# Patient Record
Sex: MALE | Race: WHITE | NOT HISPANIC OR LATINO | Employment: OTHER | URBAN - METROPOLITAN AREA
[De-identification: names, ages, dates, MRNs, and addresses within clinical notes are randomized per-mention and may not be internally consistent; named-entity substitution may affect disease eponyms.]

---

## 2023-07-06 LAB
ALANINE AMINOTRANSFERASE (SGPT) (U/L) IN SER/PLAS: 28 U/L (ref 10–52)
ANION GAP IN SER/PLAS: 17 MMOL/L (ref 10–20)
CALCIUM (MG/DL) IN SER/PLAS: 10.2 MG/DL (ref 8.6–10.6)
CARBON DIOXIDE, TOTAL (MMOL/L) IN SER/PLAS: 27 MMOL/L (ref 21–32)
CHLORIDE (MMOL/L) IN SER/PLAS: 100 MMOL/L (ref 98–107)
CREATININE (MG/DL) IN SER/PLAS: 1.02 MG/DL (ref 0.5–1.3)
ERYTHROCYTE DISTRIBUTION WIDTH (RATIO) BY AUTOMATED COUNT: 12.9 % (ref 11.5–14.5)
ERYTHROCYTE MEAN CORPUSCULAR HEMOGLOBIN CONCENTRATION (G/DL) BY AUTOMATED: 33.8 G/DL (ref 32–36)
ERYTHROCYTE MEAN CORPUSCULAR VOLUME (FL) BY AUTOMATED COUNT: 86 FL (ref 80–100)
ERYTHROCYTES (10*6/UL) IN BLOOD BY AUTOMATED COUNT: 5.38 X10E12/L (ref 4.5–5.9)
GFR MALE: 85 ML/MIN/1.73M2
GLUCOSE (MG/DL) IN SER/PLAS: 118 MG/DL (ref 74–99)
HEMATOCRIT (%) IN BLOOD BY AUTOMATED COUNT: 46.5 % (ref 41–52)
HEMOGLOBIN (G/DL) IN BLOOD: 15.7 G/DL (ref 13.5–17.5)
LEUKOCYTES (10*3/UL) IN BLOOD BY AUTOMATED COUNT: 4.9 X10E9/L (ref 4.4–11.3)
NRBC (PER 100 WBCS) BY AUTOMATED COUNT: 0 /100 WBC (ref 0–0)
PLATELETS (10*3/UL) IN BLOOD AUTOMATED COUNT: 196 X10E9/L (ref 150–450)
POTASSIUM (MMOL/L) IN SER/PLAS: 3.8 MMOL/L (ref 3.5–5.3)
PROSTATE SPECIFIC AG (NG/ML) IN SER/PLAS: 0.28 NG/ML (ref 0–4)
SODIUM (MMOL/L) IN SER/PLAS: 140 MMOL/L (ref 136–145)
TESTOSTERONE (NG/DL) IN SER/PLAS: 762 NG/DL (ref 240–1000)
UREA NITROGEN (MG/DL) IN SER/PLAS: 16 MG/DL (ref 6–23)

## 2023-09-18 DIAGNOSIS — E29.1 HYPOGONADISM MALE: Primary | ICD-10-CM

## 2023-09-18 RX ORDER — TESTOSTERONE 20.25 MG/1.25G
2 GEL TOPICAL DAILY
Qty: 75 G | Refills: 5 | Status: SHIPPED | OUTPATIENT
Start: 2023-09-18 | End: 2024-03-29 | Stop reason: SDUPTHER

## 2023-09-18 RX ORDER — TESTOSTERONE 20.25 MG/1.25G
2 GEL TOPICAL DAILY
COMMUNITY
End: 2023-09-18 | Stop reason: SDUPTHER

## 2023-10-16 ENCOUNTER — LAB (OUTPATIENT)
Dept: LAB | Facility: LAB | Age: 58
End: 2023-10-16
Payer: COMMERCIAL

## 2023-10-16 DIAGNOSIS — Z00.00 ROUTINE PHYSICAL EXAMINATION: ICD-10-CM

## 2023-10-16 LAB
25(OH)D3 SERPL-MCNC: 42 NG/ML (ref 30–100)
ALBUMIN SERPL BCP-MCNC: 4.7 G/DL (ref 3.4–5)
ALP SERPL-CCNC: 48 U/L (ref 33–120)
ALT SERPL W P-5'-P-CCNC: 23 U/L (ref 10–52)
ANION GAP SERPL CALC-SCNC: 12 MMOL/L (ref 10–20)
APPEARANCE UR: CLEAR
AST SERPL W P-5'-P-CCNC: 17 U/L (ref 9–39)
BASOPHILS # BLD AUTO: 0.02 X10*3/UL (ref 0–0.1)
BASOPHILS NFR BLD AUTO: 0.4 %
BILIRUB SERPL-MCNC: 0.7 MG/DL (ref 0–1.2)
BILIRUB UR STRIP.AUTO-MCNC: NEGATIVE MG/DL
BUN SERPL-MCNC: 13 MG/DL (ref 6–23)
CALCIUM SERPL-MCNC: 9.7 MG/DL (ref 8.6–10.6)
CHLORIDE SERPL-SCNC: 102 MMOL/L (ref 98–107)
CHOLEST SERPL-MCNC: 226 MG/DL (ref 0–199)
CHOLESTEROL/HDL RATIO: 4.9
CO2 SERPL-SCNC: 29 MMOL/L (ref 21–32)
COLOR UR: YELLOW
CREAT SERPL-MCNC: 0.99 MG/DL (ref 0.5–1.3)
EOSINOPHIL # BLD AUTO: 0.06 X10*3/UL (ref 0–0.7)
EOSINOPHIL NFR BLD AUTO: 1.3 %
ERYTHROCYTE [DISTWIDTH] IN BLOOD BY AUTOMATED COUNT: 12.9 % (ref 11.5–14.5)
EST. AVERAGE GLUCOSE BLD GHB EST-MCNC: 97 MG/DL
GFR SERPL CREATININE-BSD FRML MDRD: 88 ML/MIN/1.73M*2
GLUCOSE SERPL-MCNC: 101 MG/DL (ref 74–99)
GLUCOSE UR STRIP.AUTO-MCNC: NEGATIVE MG/DL
HBA1C MFR BLD: 5 %
HCT VFR BLD AUTO: 45.1 % (ref 41–52)
HDLC SERPL-MCNC: 45.8 MG/DL
HGB BLD-MCNC: 15.1 G/DL (ref 13.5–17.5)
IMM GRANULOCYTES # BLD AUTO: 0.01 X10*3/UL (ref 0–0.7)
IMM GRANULOCYTES NFR BLD AUTO: 0.2 % (ref 0–0.9)
KETONES UR STRIP.AUTO-MCNC: NEGATIVE MG/DL
LDLC SERPL CALC-MCNC: 152 MG/DL
LEUKOCYTE ESTERASE UR QL STRIP.AUTO: NEGATIVE
LYMPHOCYTES # BLD AUTO: 1.57 X10*3/UL (ref 1.2–4.8)
LYMPHOCYTES NFR BLD AUTO: 33.6 %
MCH RBC QN AUTO: 29.6 PG (ref 26–34)
MCHC RBC AUTO-ENTMCNC: 33.5 G/DL (ref 32–36)
MCV RBC AUTO: 88 FL (ref 80–100)
MONOCYTES # BLD AUTO: 0.44 X10*3/UL (ref 0.1–1)
MONOCYTES NFR BLD AUTO: 9.4 %
NEUTROPHILS # BLD AUTO: 2.57 X10*3/UL (ref 1.2–7.7)
NEUTROPHILS NFR BLD AUTO: 55.1 %
NITRITE UR QL STRIP.AUTO: NEGATIVE
NON HDL CHOLESTEROL: 180 MG/DL (ref 0–149)
NRBC BLD-RTO: 0 /100 WBCS (ref 0–0)
PH UR STRIP.AUTO: 7 [PH]
PLATELET # BLD AUTO: 194 X10*3/UL (ref 150–450)
PMV BLD AUTO: 11.2 FL (ref 7.5–11.5)
POTASSIUM SERPL-SCNC: 4.6 MMOL/L (ref 3.5–5.3)
PROT SERPL-MCNC: 7.4 G/DL (ref 6.4–8.2)
PROT UR STRIP.AUTO-MCNC: NEGATIVE MG/DL
PSA SERPL-MCNC: 0.28 NG/ML
RBC # BLD AUTO: 5.1 X10*6/UL (ref 4.5–5.9)
RBC # UR STRIP.AUTO: NEGATIVE /UL
SODIUM SERPL-SCNC: 138 MMOL/L (ref 136–145)
SP GR UR STRIP.AUTO: 1.01
TRIGL SERPL-MCNC: 142 MG/DL (ref 0–149)
TSH SERPL-ACNC: 1.71 MIU/L (ref 0.44–3.98)
UROBILINOGEN UR STRIP.AUTO-MCNC: <2 MG/DL
VLDL: 28 MG/DL (ref 0–40)
WBC # BLD AUTO: 4.7 X10*3/UL (ref 4.4–11.3)

## 2023-10-16 PROCEDURE — 85025 COMPLETE CBC W/AUTO DIFF WBC: CPT

## 2023-10-16 PROCEDURE — 36415 COLL VENOUS BLD VENIPUNCTURE: CPT

## 2023-10-16 PROCEDURE — 82306 VITAMIN D 25 HYDROXY: CPT

## 2023-10-16 PROCEDURE — 81003 URINALYSIS AUTO W/O SCOPE: CPT

## 2023-10-16 PROCEDURE — 83036 HEMOGLOBIN GLYCOSYLATED A1C: CPT

## 2023-10-16 PROCEDURE — 84443 ASSAY THYROID STIM HORMONE: CPT

## 2023-10-16 PROCEDURE — 80053 COMPREHEN METABOLIC PANEL: CPT

## 2023-10-16 PROCEDURE — 80061 LIPID PANEL: CPT

## 2023-10-16 PROCEDURE — 84153 ASSAY OF PSA TOTAL: CPT

## 2023-10-17 PROBLEM — R83.9 SPINAL FLUID ABNORM: Status: ACTIVE | Noted: 2023-10-17

## 2023-10-17 PROBLEM — G50.0 TRIGEMINAL NEURALGIA: Status: ACTIVE | Noted: 2023-10-17

## 2023-10-17 PROBLEM — M54.16 LUMBAR RADICULOPATHY: Status: ACTIVE | Noted: 2023-10-17

## 2023-10-17 PROBLEM — G89.29 CHRONIC HEADACHES: Status: ACTIVE | Noted: 2023-10-17

## 2023-10-17 PROBLEM — F41.9 ANXIETY: Status: ACTIVE | Noted: 2023-10-17

## 2023-10-17 PROBLEM — R90.89 ABNORMAL BRAIN MRI: Status: ACTIVE | Noted: 2023-10-17

## 2023-10-17 PROBLEM — M26.629 TEMPOROMANDIBULAR JOINT ARTHRALGIA: Status: ACTIVE | Noted: 2023-10-17

## 2023-10-17 PROBLEM — G54.0 THORACIC OUTLET SYNDROME: Status: ACTIVE | Noted: 2023-10-17

## 2023-10-17 PROBLEM — J34.3 NASAL TURBINATE HYPERTROPHY: Status: ACTIVE | Noted: 2023-10-17

## 2023-10-17 PROBLEM — M1A.9XX0 GOUT, CHRONIC: Status: ACTIVE | Noted: 2023-10-17

## 2023-10-17 PROBLEM — Q17.9 EAR ANOMALY: Status: ACTIVE | Noted: 2023-10-17

## 2023-10-17 PROBLEM — M54.2 CHRONIC NECK PAIN: Status: ACTIVE | Noted: 2023-10-17

## 2023-10-17 PROBLEM — M25.622 STIFFNESS OF LEFT ELBOW JOINT: Status: ACTIVE | Noted: 2023-10-17

## 2023-10-17 PROBLEM — J34.829 NASAL VALVE COLLAPSE: Status: ACTIVE | Noted: 2023-10-17

## 2023-10-17 PROBLEM — E29.1 HYPOGONADISM MALE: Status: ACTIVE | Noted: 2023-10-17

## 2023-10-17 PROBLEM — G89.29 CHRONIC NECK PAIN: Status: ACTIVE | Noted: 2023-10-17

## 2023-10-17 PROBLEM — E78.5 HYPERLIPIDEMIA: Status: ACTIVE | Noted: 2023-10-17

## 2023-10-17 PROBLEM — N52.9 MALE ERECTILE DISORDER: Status: ACTIVE | Noted: 2023-10-17

## 2023-10-17 PROBLEM — K21.9 ESOPHAGEAL REFLUX: Status: ACTIVE | Noted: 2023-10-17

## 2023-10-17 PROBLEM — R07.89 CHEST TIGHTNESS: Status: ACTIVE | Noted: 2023-10-17

## 2023-10-17 PROBLEM — T50.905A ADVERSE REACTION TO DRUG, INITIAL ENCOUNTER: Status: ACTIVE | Noted: 2023-10-17

## 2023-10-17 PROBLEM — G56.22: Status: ACTIVE | Noted: 2023-10-17

## 2023-10-17 PROBLEM — E23.6 EMPTY SELLA SYNDROME (MULTI): Status: ACTIVE | Noted: 2023-10-17

## 2023-10-17 PROBLEM — G50.9 TRIGEMINAL NEUROPATHY: Status: ACTIVE | Noted: 2023-10-17

## 2023-10-17 PROBLEM — R91.8 LUNG NODULES: Status: ACTIVE | Noted: 2023-10-17

## 2023-10-17 PROBLEM — R07.89 CHEST PRESSURE: Status: ACTIVE | Noted: 2023-10-17

## 2023-10-17 PROBLEM — R51.9 CHRONIC HEADACHES: Status: ACTIVE | Noted: 2023-10-17

## 2023-10-17 PROBLEM — R22.2 LUMP OF SKIN OF BACK: Status: ACTIVE | Noted: 2023-10-17

## 2023-10-17 PROBLEM — M54.12 CERVICAL RADICULOPATHY: Status: ACTIVE | Noted: 2023-10-17

## 2023-10-17 PROBLEM — R19.00 ABDOMINAL WALL BULGE: Status: ACTIVE | Noted: 2023-10-17

## 2023-10-17 PROBLEM — R20.0 FACIAL NUMBNESS: Status: ACTIVE | Noted: 2023-10-17

## 2023-10-17 PROBLEM — D12.6 TUBULAR ADENOMA OF COLON: Status: ACTIVE | Noted: 2023-10-17

## 2023-10-17 PROBLEM — D75.1 SECONDARY POLYCYTHEMIA: Status: ACTIVE | Noted: 2023-10-17

## 2023-10-17 PROBLEM — E78.00 PURE HYPERCHOLESTEROLEMIA: Status: ACTIVE | Noted: 2023-10-17

## 2023-10-17 PROBLEM — M25.852 FEMOROACETABULAR IMPINGEMENT OF LEFT HIP: Status: ACTIVE | Noted: 2023-10-17

## 2023-10-17 PROBLEM — M13.0 POLYARTHRITIS: Status: ACTIVE | Noted: 2023-10-17

## 2023-10-17 PROBLEM — R31.9 HEMATURIA: Status: ACTIVE | Noted: 2023-10-17

## 2023-10-17 PROBLEM — M54.81 OCCIPITAL NEURALGIA OF LEFT SIDE: Status: ACTIVE | Noted: 2023-10-17

## 2023-10-17 PROBLEM — K76.9 LIVER LESION: Status: ACTIVE | Noted: 2023-10-17

## 2023-10-17 PROBLEM — J31.0 CHRONIC RHINITIS: Status: ACTIVE | Noted: 2023-10-17

## 2023-10-17 PROBLEM — D40.0 NEOPLASM OF UNCERTAIN BEHAVIOR OF PROSTATE: Status: ACTIVE | Noted: 2023-10-17

## 2023-10-17 PROBLEM — R89.9 ABNORMAL LABORATORY TEST: Status: ACTIVE | Noted: 2023-10-17

## 2023-10-17 PROBLEM — M99.01 SEGMENTAL AND SOMATIC DYSFUNCTION OF CERVICAL REGION: Status: ACTIVE | Noted: 2023-10-17

## 2023-10-17 PROBLEM — E79.0 HYPERURICEMIA: Status: ACTIVE | Noted: 2023-10-17

## 2023-10-17 PROBLEM — G89.29 CHRONIC PAIN: Status: ACTIVE | Noted: 2023-10-17

## 2023-10-17 PROBLEM — R39.9 LOWER URINARY TRACT SYMPTOMS (LUTS): Status: ACTIVE | Noted: 2023-10-17

## 2023-10-17 PROBLEM — S46.212A BICEPS RUPTURE, DISTAL, LEFT, INITIAL ENCOUNTER: Status: ACTIVE | Noted: 2023-10-17

## 2023-10-17 PROBLEM — M95.0 NASAL VALVE COLLAPSE: Status: ACTIVE | Noted: 2023-10-17

## 2023-10-17 PROBLEM — N48.6 PEYRONIE DISEASE: Status: ACTIVE | Noted: 2023-10-17

## 2023-10-17 PROBLEM — E55.9 VITAMIN D DEFICIENCY: Status: ACTIVE | Noted: 2023-10-17

## 2023-10-17 PROBLEM — J34.2 NASAL SEPTAL DEVIATION: Status: ACTIVE | Noted: 2023-10-17

## 2023-10-17 PROBLEM — T78.1XXA ADVERSE FOOD REACTION: Status: ACTIVE | Noted: 2023-10-17

## 2023-10-17 RX ORDER — FINASTERIDE 5 MG
0.25 TABLET ORAL DAILY
COMMUNITY
Start: 2023-06-02

## 2023-10-17 RX ORDER — PREDNISONE 10 MG/1
10 TABLET ORAL 2 TIMES DAILY
COMMUNITY
Start: 2012-10-29 | End: 2023-10-18 | Stop reason: ALTCHOICE

## 2023-10-17 RX ORDER — TRAMADOL HYDROCHLORIDE 50 MG/1
50-100 TABLET ORAL EVERY 6 HOURS PRN
COMMUNITY
Start: 2012-10-29 | End: 2023-10-18 | Stop reason: ALTCHOICE

## 2023-10-17 RX ORDER — LIDOCAINE HYDROCHLORIDE 20 MG/ML
15 SOLUTION ORAL; TOPICAL
COMMUNITY
Start: 2023-01-19 | End: 2023-10-18 | Stop reason: ALTCHOICE

## 2023-10-17 RX ORDER — FEBUXOSTAT 40 MG/1
40 TABLET, FILM COATED ORAL DAILY
COMMUNITY
End: 2024-05-24 | Stop reason: SDUPTHER

## 2023-10-17 RX ORDER — PREDNISONE 5 MG/1
5 TABLET ORAL
COMMUNITY
Start: 2012-10-29 | End: 2023-10-18 | Stop reason: ALTCHOICE

## 2023-10-17 RX ORDER — ERGOCALCIFEROL (VITAMIN D2) 50 MCG
4000 CAPSULE ORAL DAILY
COMMUNITY
Start: 2018-10-19

## 2023-10-17 RX ORDER — COLCHICINE 0.6 MG/1
0.6 TABLET ORAL DAILY
COMMUNITY
Start: 2012-11-14

## 2023-10-17 RX ORDER — IBUPROFEN 200 MG
600 TABLET ORAL 2 TIMES DAILY
COMMUNITY
End: 2023-10-18 | Stop reason: ALTCHOICE

## 2023-10-17 RX ORDER — DULOXETIN HYDROCHLORIDE 30 MG/1
60 CAPSULE, DELAYED RELEASE ORAL DAILY
COMMUNITY
Start: 2022-12-01 | End: 2023-10-18 | Stop reason: ALTCHOICE

## 2023-10-17 RX ORDER — TADALAFIL 5 MG/1
1 TABLET ORAL DAILY
COMMUNITY
Start: 2023-01-23 | End: 2024-03-29 | Stop reason: SDUPTHER

## 2023-10-17 RX ORDER — TURMERIC ROOT EXTRACT 500 MG
1 TABLET ORAL DAILY
COMMUNITY

## 2023-10-17 RX ORDER — CARBAMAZEPINE 200 MG/1
1 TABLET ORAL 2 TIMES DAILY
COMMUNITY
Start: 2019-02-13 | End: 2023-10-18 | Stop reason: ALTCHOICE

## 2023-10-17 RX ORDER — ALLOPURINOL 300 MG/1
TABLET ORAL
COMMUNITY
Start: 2012-11-07 | End: 2023-10-18 | Stop reason: ALTCHOICE

## 2023-10-17 RX ORDER — CHOLECALCIFEROL (VITAMIN D3) 50 MCG
100 TABLET ORAL DAILY
COMMUNITY

## 2023-10-17 RX ORDER — INDOMETHACIN 25 MG/1
25 CAPSULE ORAL
COMMUNITY
Start: 2022-11-09 | End: 2023-10-18 | Stop reason: ALTCHOICE

## 2023-10-18 ENCOUNTER — OFFICE VISIT (OUTPATIENT)
Dept: PRIMARY CARE | Facility: CLINIC | Age: 58
End: 2023-10-18
Payer: COMMERCIAL

## 2023-10-18 VITALS
HEART RATE: 70 BPM | SYSTOLIC BLOOD PRESSURE: 124 MMHG | DIASTOLIC BLOOD PRESSURE: 80 MMHG | BODY MASS INDEX: 31.1 KG/M2 | WEIGHT: 210 LBS | OXYGEN SATURATION: 97 % | HEIGHT: 69 IN

## 2023-10-18 DIAGNOSIS — M54.16 LUMBAR RADICULOPATHY: ICD-10-CM

## 2023-10-18 DIAGNOSIS — D12.6 TUBULAR ADENOMA OF COLON: Primary | ICD-10-CM

## 2023-10-18 DIAGNOSIS — G50.9 TRIGEMINAL NEUROPATHY: ICD-10-CM

## 2023-10-18 DIAGNOSIS — Z12.11 COLON CANCER SCREENING: ICD-10-CM

## 2023-10-18 DIAGNOSIS — R26.81 GAIT INSTABILITY: ICD-10-CM

## 2023-10-18 DIAGNOSIS — E29.1 HYPOGONADISM MALE: ICD-10-CM

## 2023-10-18 DIAGNOSIS — E78.2 MIXED HYPERLIPIDEMIA: Primary | ICD-10-CM

## 2023-10-18 DIAGNOSIS — K21.9 GASTROESOPHAGEAL REFLUX DISEASE WITHOUT ESOPHAGITIS: ICD-10-CM

## 2023-10-18 DIAGNOSIS — M1A.9XX0 CHRONIC GOUT WITHOUT TOPHUS, UNSPECIFIED CAUSE, UNSPECIFIED SITE: ICD-10-CM

## 2023-10-18 PROCEDURE — UHSPHYS PR UH SELECT PHYSICAL: Performed by: INTERNAL MEDICINE

## 2023-10-18 RX ORDER — BEMPEDOIC ACID 180 MG/1
180 TABLET, FILM COATED ORAL DAILY
Qty: 30 TABLET | Refills: 11 | Status: SHIPPED | OUTPATIENT
Start: 2023-10-18 | End: 2023-10-27 | Stop reason: SINTOL

## 2023-10-18 RX ORDER — POLYETHYLENE GLYCOL 3350, SODIUM CHLORIDE, SODIUM BICARBONATE AND POTASSIUM CHLORIDE 420; 5.72; 11.2; 1.48 G/4L; G/4L; G/4L; G/4L
4000 POWDER, FOR SOLUTION ORAL ONCE
Qty: 4000 ML | Refills: 0 | Status: SHIPPED | OUTPATIENT
Start: 2023-10-18 | End: 2023-10-18

## 2023-10-18 ASSESSMENT — ENCOUNTER SYMPTOMS
FREQUENCY: 0
BRUISES/BLEEDS EASILY: 0
WHEEZING: 0
CHEST TIGHTNESS: 0
HEADACHES: 0
ABDOMINAL PAIN: 0
NAUSEA: 0
FEVER: 0
MYALGIAS: 0
SORE THROAT: 0
POLYDIPSIA: 0
SHORTNESS OF BREATH: 0
CHILLS: 0
SINUS PAIN: 0
ABDOMINAL DISTENTION: 0
RHINORRHEA: 0
ARTHRALGIAS: 0
DYSPHORIC MOOD: 0
BLOOD IN STOOL: 0
ADENOPATHY: 0
ACTIVITY CHANGE: 0
HEMATURIA: 0
SLEEP DISTURBANCE: 0
DIZZINESS: 0
BACK PAIN: 0
NECK STIFFNESS: 0
SINUS PRESSURE: 0
FATIGUE: 0
CONSTIPATION: 0
JOINT SWELLING: 0
PALPITATIONS: 0
HYPERACTIVE: 0
DIARRHEA: 0
WEAKNESS: 0
DYSURIA: 0
CONFUSION: 0
TROUBLE SWALLOWING: 0
CONSTITUTIONAL NEGATIVE: 1
FACIAL SWELLING: 0
LIGHT-HEADEDNESS: 0
NERVOUS/ANXIOUS: 0
VOMITING: 0
UNEXPECTED WEIGHT CHANGE: 0
NUMBNESS: 0
COUGH: 0

## 2023-10-18 ASSESSMENT — PAIN SCALES - GENERAL: PAINLEVEL: 2

## 2023-10-18 NOTE — ASSESSMENT & PLAN NOTE
Lipids remain above goal.  We will start him on bempedoic acid 180 mg daily.  He will return in 6 weeks for repeat lipids.  Patient is intolerant of statins due to allergy.

## 2023-10-18 NOTE — ASSESSMENT & PLAN NOTE
GERD symptoms are under much better control he is taking no medication currently for it takes as needed antacids.

## 2023-10-18 NOTE — PROGRESS NOTES
Link Reyes       Patient here for complete physical and a general checkup.  He generally feels quite well.  He has several ongoing issues.  1.  There is made of his hyperlipidemia patient has had chronic hyperlipidemia but is unable to take statins due to some type of allergy.  He has never been on Nexletol.  2.  He continues to struggle with issues around the left side of his body he had a history of a fracture of his left ankle number of years ago and since that time his had weakness in his left hip flexors and waist area.  He has done physical therapy but still has ongoing issues with gait and an occasional weakness.  He is also had some chronic headaches on the left side as well.  He has done some good work with physical therapy and has significantly improved his headachesSo he is no longer taking any medication for that.  3.  He has a history of chronic constipation.  He does not take any fiber supplements and does not drink very much in the way of water.  4.  His weight remains about 20 pounds over his ideal body weight.  He exercises vigorously on a regular basis but does not watch his diet very carefully.  Otherwise the patient denies chest pain shortness of breath GI or  complaints.  He remains on Uloric for his gout and has not had an attack in a number of years.  He continues to take Proscar for hair loss which has been fairly effective for him.           Review of Systems   Constitutional: Negative.  Negative for activity change, chills, fatigue, fever and unexpected weight change.   HENT:  Negative for dental problem, ear pain, facial swelling, hearing loss, mouth sores, rhinorrhea, sinus pressure, sinus pain, sore throat, tinnitus and trouble swallowing.    Eyes:  Negative for visual disturbance.   Respiratory:  Negative for cough, chest tightness, shortness of breath and wheezing.    Cardiovascular:  Negative for chest pain, palpitations and leg swelling.   Gastrointestinal:  Negative  "for abdominal distention, abdominal pain, blood in stool, constipation, diarrhea, nausea and vomiting.   Endocrine: Negative for cold intolerance, heat intolerance, polydipsia and polyuria.   Genitourinary:  Negative for dysuria, frequency, hematuria and urgency.   Musculoskeletal:  Negative for arthralgias, back pain, joint swelling, myalgias and neck stiffness.   Skin:  Negative for rash.   Allergic/Immunologic: Negative for food allergies.   Neurological:  Negative for dizziness, weakness, light-headedness, numbness and headaches.   Hematological:  Negative for adenopathy. Does not bruise/bleed easily.   Psychiatric/Behavioral:  Negative for confusion, dysphoric mood and sleep disturbance. The patient is not nervous/anxious and is not hyperactive.           Objective      Visit Vitals  /80 (BP Location: Right arm, Patient Position: Sitting, BP Cuff Size: Adult)   Pulse 70   Ht 1.74 m (5' 8.5\")   Wt 95.3 kg (210 lb)   SpO2 97%   BMI 31.47 kg/m²   Smoking Status Never   BSA 2.15 m²        Physical Exam  Constitutional:       Appearance: Normal appearance. He is normal weight.   HENT:      Head: Normocephalic.      Right Ear: Tympanic membrane and ear canal normal.      Left Ear: Tympanic membrane and ear canal normal.      Nose: Nose normal.      Mouth/Throat:      Pharynx: Oropharynx is clear.   Eyes:      Extraocular Movements: Extraocular movements intact.      Conjunctiva/sclera: Conjunctivae normal.      Pupils: Pupils are equal, round, and reactive to light.   Neck:      Thyroid: No thyroid mass or thyromegaly.      Vascular: No carotid bruit or JVD.   Cardiovascular:      Rate and Rhythm: Normal rate and regular rhythm.      Pulses: Normal pulses.           Carotid pulses are 2+ on the right side and 2+ on the left side.       Radial pulses are 2+ on the right side and 2+ on the left side.        Femoral pulses are 2+ on the right side and 2+ on the left side.       Popliteal pulses are 2+ on the right " side and 2+ on the left side.        Dorsalis pedis pulses are 2+ on the right side and 2+ on the left side.        Posterior tibial pulses are 2+ on the right side and 2+ on the left side.      Heart sounds: Normal heart sounds. No murmur heard.  Pulmonary:      Effort: Pulmonary effort is normal.      Breath sounds: Normal breath sounds.   Abdominal:      General: Abdomen is flat. Bowel sounds are normal. There is no distension.      Palpations: Abdomen is soft. There is no mass.      Tenderness: There is no guarding or rebound.   Genitourinary:     Penis: Normal.       Prostate: Normal.      Rectum: Normal.   Musculoskeletal:         General: Normal range of motion.      Cervical back: Normal range of motion and neck supple.      Right lower leg: No edema.      Left lower leg: No edema.   Lymphadenopathy:      Cervical: No cervical adenopathy.   Skin:     General: Skin is warm and dry.      Findings: No lesion or rash.   Neurological:      General: No focal deficit present.      Mental Status: He is alert and oriented to person, place, and time.   Psychiatric:         Mood and Affect: Mood normal.        Problem List Items Addressed This Visit       Esophageal reflux     GERD symptoms are under much better control he is taking no medication currently for it takes as needed antacids.         Hyperlipidemia - Primary     Lipids remain above goal.  We will start him on bempedoic acid 180 mg daily.  He will return in 6 weeks for repeat lipids.  Patient is intolerant of statins due to allergy.         Relevant Medications    bempedoic acid (Nexletol) 180 mg tablet    Gout, chronic     Remains on Uloric daily he has had very few flareups in the last several years.         Hypogonadism male     Patient remains on testosterone supplements.  He is doing quite well.         Lumbar radiculopathy     Patient has this chronic syndrome of weakness in his hip flexors and pelvic girdle.  He has been to physical therapy but his  symptoms are persisting I think makes sense to have him evaluated by physical medicine and rehab deceiving get into a more comprehensive program of strengthening and gait modification.         Trigeminal neuropathy     Symptoms have pretty much resolved with some Pilates and stress reduction modalities.  He is off all medications for it.         Colon cancer screening     Patient is due for colonoscopy we will get that scheduled for him.         Relevant Orders    Colonoscopy Screening     Other Visit Diagnoses       Gait instability        Relevant Orders    Referral to Physical Medicine Rehab

## 2023-10-18 NOTE — ASSESSMENT & PLAN NOTE
Patient has this chronic syndrome of weakness in his hip flexors and pelvic girdle.  He has been to physical therapy but his symptoms are persisting I think makes sense to have him evaluated by physical medicine and rehab deceiving get into a more comprehensive program of strengthening and gait modification.

## 2023-10-18 NOTE — ASSESSMENT & PLAN NOTE
Symptoms have pretty much resolved with some Pilates and stress reduction modalities.  He is off all medications for it.

## 2023-10-27 DIAGNOSIS — E78.2 MIXED HYPERLIPIDEMIA: Primary | ICD-10-CM

## 2023-10-27 RX ORDER — ICOSAPENT ETHYL 1 G/1
2 CAPSULE ORAL
Qty: 120 CAPSULE | Refills: 11 | Status: SHIPPED | OUTPATIENT
Start: 2023-10-27 | End: 2024-10-26

## 2023-11-02 ENCOUNTER — APPOINTMENT (OUTPATIENT)
Dept: ORTHOPEDIC SURGERY | Facility: CLINIC | Age: 58
End: 2023-11-02
Payer: COMMERCIAL

## 2023-11-03 ENCOUNTER — APPOINTMENT (OUTPATIENT)
Dept: GASTROENTEROLOGY | Facility: HOSPITAL | Age: 58
End: 2023-11-03
Payer: COMMERCIAL

## 2023-11-30 ENCOUNTER — OFFICE VISIT (OUTPATIENT)
Dept: ORTHOPEDIC SURGERY | Facility: CLINIC | Age: 58
End: 2023-11-30
Payer: COMMERCIAL

## 2023-11-30 DIAGNOSIS — M53.3 SACROILIAC JOINT DYSFUNCTION OF LEFT SIDE: Primary | ICD-10-CM

## 2023-11-30 DIAGNOSIS — R19.00 ABDOMINAL WALL BULGE: ICD-10-CM

## 2023-11-30 DIAGNOSIS — M21.42 PES PLANUS OF LEFT FOOT: ICD-10-CM

## 2023-11-30 DIAGNOSIS — M25.552 LEFT HIP PAIN: ICD-10-CM

## 2023-11-30 PROCEDURE — 99204 OFFICE O/P NEW MOD 45 MIN: CPT | Performed by: PHYSICAL MEDICINE & REHABILITATION

## 2023-11-30 NOTE — PROGRESS NOTES
PM&R  / Ortho clinic eval:    IMPRESSION:    Complex chronic left lower extremity tightness and malalignment.    The only objective physical finding is reduced left hip range of motion and positive left anterior innominate rotation/sacroiliac dysfunction, and mild left calf atrophy probably related to his remote tib fib fracture.  Pes planus is slightly worse on the left also  Left-sided neck head and back pain, probably old trigeminal neuralgia with superimposed facet joint pain, much improved from previous and I do not think directly related to his lower extremity issues    RECOMMENDATIONS:  -Get MRI left hip, I will look at images and message with results  -Consult Dr. Leda Ly for her opinion, and probably manipulation for left SI joint and possible adaptations to his home therapy program.  He might benefit from some needling also?  If he does not make progress with this I would recommend a repeat lumbar MRI and I would see him again afterwards to interpret.  -We had a pretty long discussion about foot orthoses, and he is doing about as well as  I'd expect with over-the-counter power step devices, next step would be a custom molded semirigid pair, possibly with more medial posting on the left, but this is a fairly large commitment and might not be tolerated - so we will defer for now  -Discussed his brief vertigo symptoms from today, possible migraine but reconfirmed plan from Dr. Molina for emergency evaluation if symptoms recur or anything else atypical neurological occurs -Sometimes these symptoms are triggered by cervical facet joint pain so we will keep that in mind also  f/u next available after hip MRI     Diagnoses and plan discussed with the patient, patient educated on above diagnoses and treatments, including alternatives     Anson Graves MD, FAAPMR, R-MSK  Chief, Division of PM&R  Board Certified in PM&R and Sports  "Medicine  ____________________________________________________________________    11/30/2023    CC: Patient complains of left sided body issues - weakness    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    Seen at the request of Roberto Molina via  [employer], for left sided weakness/pain/tightness. H/o left sided head and trigeminal and occipital nerve pain ~2015.  Also Tib/fib Fx in 2005 playing kickball with kids.  Chronic neck head and face pain has been much improved but still constant low-grade.   More concern is left sided hip and back pain.  He has already been doing extensive work with a  and with [employer], including one-on-one sessions.  It feels of he rotates his leg externally everything falls back into alignment, but without it things are \"pushed backwards\" and also tends to \"fall forward\".  He also gets a sensation that the left lower abdomen is more prominent.  Pain scale  minimal - more like weakness in lower body but 2/10 on average and 5/10 worst pain in past week.    Treadmill and bike. And weights - but MUCH weaker on left side with isolated strength exercises.      Patient reports no fevers, chills, sweats, night pain, weight loss or cancer history, no bowel/bladder control issues.     I reviewed the Orthopedic Surgery Adult Patient History Sheet from 11/30/2023 including pertinent history and ROS.     Pertinent Physical Exam:  MSK: Cervical Spine: ROM mildly reduced all planes without provocation of pain, mild thoracic kyphosis but overall normal alignment, mild mid cervical facet tender, Spurling negative   Lumbar Spine: Mildly reduced  ROM more limited but without significant pain in extension, none tender to palpation, mild left anterior innominate pelvic tilt/rotation,  SI joint maneuvers mildly positive with Kd, Gaenslen and spring test and does have restricted hip range of motion with tightness and pain mostly around the lateral hip but some into the sacroiliac " region.  Str Leg Raise negative  Neuro: Normal Sensation, strength, bulk and tone of upper and  lower limbs bilaterally except mild left calf atrophy about 1 cm circumference difference, reflexes normal at patella but reduced/trace at Achilles bilaterally.    SUPPORTING DOCUMENTATION (remaining history, exam, other findings):  Work-up reviewed - this has included I personally interpreted his MRI angiogram of the head and neck from 2023 for spine structures, but these are fairly poorly visualized.  CT of cervical spine from 2019 did show some spondylosis/facet arthropathy worst at C5-6 but no significant stenosis.  MRI brachial plexus from 2022 was normal, and I did personally interpret his lumbar MRI from 2021, which shows mild degenerative changes at L4-5 and L5-S1, there is a small disc bulge at L4-5 with subarticular stenosis but this is more prominent to the right side and does not fit with current symptoms.    Treatment has included one-on-one work with a  and Pilates therapist, fairly extensively over the past year.  He has done chiropractic manipulation of some kind in the years past with success.  Does not take pain medications currently    See above for Assessment and Plan

## 2023-11-30 NOTE — LETTER
December 2, 2023     Roberto aGrcía MD  [address] [place] [address] [place] [address]    Patient: Link Reyes   YOB: 1965   Date of Visit: 11/30/2023       Dear Dr. Roberto García MD:    Thank you for referring Link Reyes to me for evaluation. Below are my notes for this consultation.  If you have questions, please do not hesitate to call me. I look forward to following your patient along with you.       Sincerely,     Anson Graves MD      CC: No Recipients  ______________________________________________________________________________________    PM&R  / Ortho clinic eval:    IMPRESSION:    Complex chronic left lower extremity tightness and malalignment.    The only objective physical finding is reduced left hip range of motion and positive left anterior innominate rotation/sacroiliac dysfunction, and mild left calf atrophy probably related to his remote tib fib fracture.  Pes planus is slightly worse on the left also  Left-sided neck head and back pain, probably old trigeminal neuralgia with superimposed facet joint pain, much improved from previous and I do not think directly related to his lower extremity issues    RECOMMENDATIONS:  -Get MRI left hip, I will look at images and message with results  -Consult Dr. Leda Ly for her opinion, and probably manipulation for left SI joint and possible adaptations to his home therapy program.  He might benefit from some needling also?  If he does not make progress with this I would recommend a repeat lumbar MRI and I would see him again afterwards to interpret.  -We had a pretty long discussion about foot orthoses, and he is doing about as well as  I'd expect with over-the-counter power step devices, next step would be a custom molded semirigid pair, possibly with more medial posting on the left, but this is a fairly large commitment and might not be tolerated - so we will defer for now  -Discussed his brief vertigo symptoms from today,  "possible migraine but reconfirmed plan from Dr. Molina for emergency evaluation if symptoms recur or anything else atypical neurological occurs -Sometimes these symptoms are triggered by cervical facet joint pain so we will keep that in mind also  f/u next available after hip MRI     Diagnoses and plan discussed with the patient, patient educated on above diagnoses and treatments, including alternatives     Anson Graves MD, FAAPMR, R-MSK  Chief, Division of PM&R  Board Certified in PM&R and Sports Medicine  ____________________________________________________________________    11/30/2023    CC: Patient complains of left sided body issues - weakness    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    Seen at the request of Roberto Molina via [employer], for left sided weakness/pain/tightness. H/o left sided head and trigeminal and occipital nerve pain ~2015.  Also Tib/fib Fx in 2005 playing kickball with kids.  Chronic neck head and face pain has been much improved but still constant low-grade.   More concern is left sided hip and back pain.  He has already been doing extensive work with a  and with [employer], including one-on-one sessions.  It feels of he rotates his leg externally everything falls back into alignment, but without it things are \"pushed backwards\" and also tends to \"fall forward\".  He also gets a sensation that the left lower abdomen is more prominent.  Pain scale  minimal - more like weakness in lower body but 2/10 on average and 5/10 worst pain in past week.    Treadmill and bike. And weights - but MUCH weaker on left side with isolated strength exercises.      Patient reports no fevers, chills, sweats, night pain, weight loss or cancer history, no bowel/bladder control issues.     I reviewed the Orthopedic Surgery Adult Patient History Sheet from 11/30/2023 including pertinent history and ROS.     Pertinent Physical Exam:  MSK: Cervical Spine: ROM mildly reduced all " planes without provocation of pain, mild thoracic kyphosis but overall normal alignment, mild mid cervical facet tender, Spurling negative   Lumbar Spine: Mildly reduced  ROM more limited but without significant pain in extension, none tender to palpation, mild left anterior innominate pelvic tilt/rotation,  SI joint maneuvers mildly positive with Kd, Gaenslen and spring test and does have restricted hip range of motion with tightness and pain mostly around the lateral hip but some into the sacroiliac region.  Str Leg Raise negative  Neuro: Normal Sensation, strength, bulk and tone of upper and  lower limbs bilaterally except mild left calf atrophy about 1 cm circumference difference, reflexes normal at patella but reduced/trace at Achilles bilaterally.    SUPPORTING DOCUMENTATION (remaining history, exam, other findings):  Work-up reviewed - this has included I personally interpreted his MRI angiogram of the head and neck from 2023 for spine structures, but these are fairly poorly visualized.  CT of cervical spine from 2019 did show some spondylosis/facet arthropathy worst at C5-6 but no significant stenosis.  MRI brachial plexus from 2022 was normal, and I did personally interpret his lumbar MRI from 2021, which shows mild degenerative changes at L4-5 and L5-S1, there is a small disc bulge at L4-5 with subarticular stenosis but this is more prominent to the right side and does not fit with current symptoms.    Treatment has included one-on-one work with a  and Pilates therapist, fairly extensively over the past year.  He has done chiropractic manipulation of some kind in the years past with success.  Does not take pain medications currently    See above for Assessment and Plan

## 2023-12-02 PROBLEM — M53.3 SACROILIAC JOINT DYSFUNCTION OF LEFT SIDE: Status: ACTIVE | Noted: 2023-12-02

## 2023-12-02 PROBLEM — M21.42 PES PLANUS OF LEFT FOOT: Status: ACTIVE | Noted: 2023-12-02

## 2023-12-02 PROBLEM — M25.552 LEFT HIP PAIN: Status: ACTIVE | Noted: 2023-12-02

## 2023-12-08 ENCOUNTER — TELEPHONE (OUTPATIENT)
Dept: PRIMARY CARE | Facility: CLINIC | Age: 58
End: 2023-12-08
Payer: COMMERCIAL

## 2023-12-08 DIAGNOSIS — J01.90 ACUTE SINUSITIS, RECURRENCE NOT SPECIFIED, UNSPECIFIED LOCATION: Primary | ICD-10-CM

## 2023-12-08 RX ORDER — DOXYCYCLINE 100 MG/1
100 TABLET ORAL 2 TIMES DAILY
Qty: 14 TABLET | Refills: 0 | Status: SHIPPED | OUTPATIENT
Start: 2023-12-08 | End: 2023-12-15

## 2023-12-08 NOTE — TELEPHONE ENCOUNTER
Called back and discussed with patient  Seems like his issues are really more than a week ago -he did feel uncomfortable shortly after the exposure but then things seem to kind of quiet down  He does report significant sensitivities to allergens in the air  He is not somewhat short of breath with this but does feel he is lost his stamina a bit  No marked wheezing  No prior history of allergy is doing some throat clearing on occasion  Has not really been taking anything for this  No fever or chills  Infrequent cough occasionally productive of some greenish phlegm  Does have significant sinus pressure worse little bit when he leans forward  Just feels very fatigued  By his description certainly sounds like a second sickening after an exposure -not aware that we have to be concerned about a mold exposure per se  Will try the following  Expect will follow-up in 48-72 hours or sooner if worsening/concerns  Requested Prescriptions     Signed Prescriptions Disp Refills    doxycycline (Adoxa) 100 mg tablet 14 tablet 0     Sig: Take 1 tablet (100 mg) by mouth 2 times a day for 7 days. Take with a full glass of water     Authorizing Provider: VERONICA CHANG

## 2023-12-08 NOTE — TELEPHONE ENCOUNTER
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 Sun and Mon. He felt very sick. He went back to work Tues because he started to feel better. Now he states he is coughing a lot. He never had a fever. Please call to discuss.

## 2023-12-18 ASSESSMENT — ENCOUNTER SYMPTOMS
FEVER: 0
CONFUSION: 0
NAUSEA: 0
DYSURIA: 0
FATIGUE: 0
SHORTNESS OF BREATH: 0
VOMITING: 0
WEAKNESS: 0
APPETITE CHANGE: 0
DIZZINESS: 0
WOUND: 0

## 2023-12-18 NOTE — PROGRESS NOTES
"Subjective   Patient ID: Link Reyes is a 58 y.o. male who presents today for the examination and treatment of his chronic left sided body pain including the left jaw, left neck, left shoulder, left hip and left sacrum.    This is visit 1 of the calendar year. [employer].     Fall risk: None. No falls in the last 6 months.     HPI -he did see Dr. Anson De Leon on 11/30/2023 for this same complaint of chronic left lower extremity tightness and hip pain.  There is also some mild left calf atrophy but this is more likely related to a history of tib-fib fracture.  His flatfoot is slightly more pronounced on the left as well.  An MRI has been ordered of the left hip- this has not yet been scheduled/done. Dr. Graves believes that manipulation of the left SI joint may help with his recovery as well as potentially some dry needling to help with myofascial tightness and imbalance.      He explains that his complaints today started approximately 10 years ago.  He did have an issue with a feeling that through his left jaw off and was starting to cause TMJ and trigeminal pain.  This was corrected however \"the damage has been done\".  Since then he has dealt with a lot of tightness in the left jaw, left suboccipital, scalenes, left pec and shoulder.  This is compounded by his left-sided ankle pain which started in the 90s after a left Achilles repair and exacerbated in the early 2000's with his left ankle fracture with hardware.  He recognizes now that the left arch does fall and he tends to pronate the foot, internally rotate the left knee and feels as if he is always twisted to the right.    He does work with a  one-on-one twice a week and is trying desperately to strengthen the left side of his body.  He feels as if it is weak because he is unable to properly engage the muscles.  He has been to the chiropractor once but did not have a true manipulation instead there was some sort of mechanical " device that was used.  He did not find any benefit with this.    Given recent antibiotic, 12/8/2023, due to some sinusitis issues.  No other significant health issues.    Social history: He works in large commercial real estate development.  His wife is on the board for  [employer] and children.  He is the father of 3 young adults.    Patient describes the pain as achiness, constant, sharp, stiffness, tightness, dull, and nagging, and rates the current pain 7 out of 10 (10 being the worst).     Review of Systems   Constitutional:  Negative for appetite change, fatigue and fever.   HENT:  Negative for congestion and hearing loss.    Eyes:  Negative for visual disturbance.   Respiratory:  Negative for shortness of breath.    Cardiovascular:  Negative for chest pain.   Gastrointestinal:  Negative for nausea and vomiting.        On Uloric for his gout and has not had an attack in a number of years.    Genitourinary:  Negative for dysuria.   Musculoskeletal:          Tib/fib Fx in 2005 playing kickball with kids.   IMAGING (Dr. Graves): CT of cervical spine from 2019 did show some spondylosis/facet arthropathy worst at C5-6 but no significant stenosis.  MRI brachial plexus from 2022 was normal, and I did personally interpret his lumbar MRI from 2021, which shows mild degenerative changes at L4-5 and L5-S1, there is a small disc bulge at L4-5 with subarticular stenosis but this is more prominent to the right side and does not fit with current symptoms.   Skin:  Negative for rash and wound.   Neurological:  Negative for dizziness and weakness.   Psychiatric/Behavioral:  Negative for confusion.    All other systems reviewed and are negative.      Objective   Observation : normal gait and normal posture. L ankle pronation.     Physical Exam  Constitutional:       General: He is not in acute distress.  HENT:      Nose: No congestion or rhinorrhea.   Musculoskeletal:      Cervical back: Tenderness present. No  deformity, signs of trauma or bony tenderness. Pain with movement present. Decreased range of motion.      Thoracic back: Tenderness present. No signs of trauma or bony tenderness. Decreased range of motion.      Lumbar back: Spasms and tenderness present. No bony tenderness. Decreased range of motion. Negative right straight leg raise test and negative left straight leg raise test.      Right hip: Normal.      Left hip: Tenderness present. No bony tenderness or crepitus. Decreased range of motion.   Neurological:      Mental Status: He is oriented to person, place, and time.      Sensory: Sensation is intact.      Motor: Motor function is intact.      Coordination: Coordination is intact.     Examination findings (palpation & ROM): Tenderness, hypertonicity and trigger points palpated throughout the left SCM, left scalenes, left upper trapezius, left pec, left glute Medius, left iliolumbar ligament.  Left heel to buttock was reduced compared to the right with left-sided low back pain and quad stiffness.  No knee pain.    Other Orthopedic Tests:  Cervical: Right Maximum compression normal and with tightness.  Left Maximum compression with local pain and with tightness.  Right Shoulder depression with local pain and with tightness.  Left Shoulder depression with tightness.  Thoracic/Lumbar/Sacrum: Right Calvo's Lumbar normal.  Left Calvo's Lumbar normal.  Right Nachlas' normal.  Left Nachlas' with local pain and with tightness.    Segmental joint dysfunction was identified in the following areas using motion palpation and/or pain provocation assessment:  Cervical: C0-C4 (Supine)  Thoracic: T4-T7 (Prone)  Lumbopelvic: L4L5/S1, L SIJ (Side-posture and Drop)    Today's treatment:  Performed spinal manipulation to the regions of segmental dysfunction identified on examination using age-appropriate and injury specific force, and manual diversified technique.     Performed soft tissue manipulation including IASTM (instrument  assisted soft tissue mobilization), MFR (Myofacial Release) and IC (ischemic compression) to the hypertonic paraspinal muscles including left SCM, left scalenes, left upper trapezius, left pec, left glute Medius, left iliolumbar ligament to patient tolerance. Performed dry needling to the region of the chief complaint to the hypertonic muscles identified upon palpation, including L SIJ, L glute medius (10 in, 10 out).    (Start time 10:15 , End Time 10:40). 2 units.     Treatment Plan:   The patient and I discussed the risks and benefits of chiropractic care. Based on the patient's subjective complaints along with the examination findings, it is advised that a course of chiropractic treatment by initiated. Consent for care was given both written and orally by the patient. The patient tolerated today's treatment with little or no additional discomfort and was instructed to contact the office for questions or concerns.     Treatment Frequency: Will see/treat patient every 2-4 weeks as therapeutic benefit is sustained, then frequency will be reduced to as needed for symptom management or as needed for acute flare-ups/exacerbation.     Please note: Voice-to-text software was used when completing this note.  While the note was proofread, portions may include grammatical errors.  Please contact me with any questions/concerns as it relates to these types of errors.

## 2023-12-19 ENCOUNTER — ALLIED HEALTH (OUTPATIENT)
Dept: INTEGRATIVE MEDICINE | Facility: CLINIC | Age: 58
End: 2023-12-19
Payer: COMMERCIAL

## 2023-12-19 DIAGNOSIS — M79.10 MYALGIA: ICD-10-CM

## 2023-12-19 DIAGNOSIS — M99.02 SEGMENTAL AND SOMATIC DYSFUNCTION OF THORACIC REGION: ICD-10-CM

## 2023-12-19 DIAGNOSIS — M99.01 SEGMENTAL AND SOMATIC DYSFUNCTION OF CERVICAL REGION: ICD-10-CM

## 2023-12-19 DIAGNOSIS — G89.29 CHRONIC JAW PAIN: ICD-10-CM

## 2023-12-19 DIAGNOSIS — M99.05 SEGMENTAL AND SOMATIC DYSFUNCTION OF PELVIC REGION: ICD-10-CM

## 2023-12-19 DIAGNOSIS — M25.512 CHRONIC LEFT SHOULDER PAIN: ICD-10-CM

## 2023-12-19 DIAGNOSIS — M24.552 HIP FLEXOR TENDON TIGHTNESS, LEFT: ICD-10-CM

## 2023-12-19 DIAGNOSIS — G44.86 CERVICOGENIC HEADACHE: ICD-10-CM

## 2023-12-19 DIAGNOSIS — M99.03 SEGMENTAL AND SOMATIC DYSFUNCTION OF LUMBAR REGION: ICD-10-CM

## 2023-12-19 DIAGNOSIS — M54.2 NECK PAIN: ICD-10-CM

## 2023-12-19 DIAGNOSIS — M79.9 POSTURAL STRAIN: ICD-10-CM

## 2023-12-19 DIAGNOSIS — G89.29 CHRONIC LEFT SHOULDER PAIN: ICD-10-CM

## 2023-12-19 DIAGNOSIS — M53.3 SACRAL BACK PAIN: Primary | ICD-10-CM

## 2023-12-19 DIAGNOSIS — M99.04 SEGMENTAL AND SOMATIC DYSFUNCTION OF SACRAL REGION: ICD-10-CM

## 2023-12-19 DIAGNOSIS — R68.84 CHRONIC JAW PAIN: ICD-10-CM

## 2023-12-19 DIAGNOSIS — M99.00 SEGMENTAL AND SOMATIC DYSFUNCTION OF HEAD REGION: ICD-10-CM

## 2023-12-19 PROCEDURE — 98942 CHIROPRACTIC MANJ 5 REGIONS: CPT | Performed by: CHIROPRACTOR

## 2023-12-19 PROCEDURE — 97140 MANUAL THERAPY 1/> REGIONS: CPT | Performed by: CHIROPRACTOR

## 2023-12-19 PROCEDURE — 99203 OFFICE O/P NEW LOW 30 MIN: CPT | Performed by: CHIROPRACTOR

## 2024-01-09 DIAGNOSIS — Z12.11 SPECIAL SCREENING FOR MALIGNANT NEOPLASMS, COLON: ICD-10-CM

## 2024-01-09 RX ORDER — SODIUM PICOSULFATE, MAGNESIUM OXIDE, AND ANHYDROUS CITRIC ACID 12; 3.5; 1 G/175ML; G/175ML; MG/175ML
1 LIQUID ORAL SEE ADMIN INSTRUCTIONS
Qty: 175 ML | Refills: 0 | Status: SHIPPED | OUTPATIENT
Start: 2024-01-09

## 2024-01-10 ENCOUNTER — ALLIED HEALTH (OUTPATIENT)
Dept: INTEGRATIVE MEDICINE | Facility: CLINIC | Age: 59
End: 2024-01-10
Payer: COMMERCIAL

## 2024-01-10 DIAGNOSIS — M99.05 SEGMENTAL AND SOMATIC DYSFUNCTION OF PELVIC REGION: ICD-10-CM

## 2024-01-10 DIAGNOSIS — M54.2 NECK PAIN: ICD-10-CM

## 2024-01-10 DIAGNOSIS — M99.00 SEGMENTAL AND SOMATIC DYSFUNCTION OF HEAD REGION: ICD-10-CM

## 2024-01-10 DIAGNOSIS — M99.03 SEGMENTAL AND SOMATIC DYSFUNCTION OF LUMBAR REGION: ICD-10-CM

## 2024-01-10 DIAGNOSIS — M99.01 SEGMENTAL AND SOMATIC DYSFUNCTION OF CERVICAL REGION: Primary | ICD-10-CM

## 2024-01-10 DIAGNOSIS — M99.04 SEGMENTAL AND SOMATIC DYSFUNCTION OF SACRAL REGION: ICD-10-CM

## 2024-01-10 DIAGNOSIS — M25.512 CHRONIC LEFT SHOULDER PAIN: ICD-10-CM

## 2024-01-10 DIAGNOSIS — G89.29 CHRONIC LEFT SHOULDER PAIN: ICD-10-CM

## 2024-01-10 DIAGNOSIS — M79.10 MYALGIA: ICD-10-CM

## 2024-01-10 DIAGNOSIS — M79.9 POSTURAL STRAIN: ICD-10-CM

## 2024-01-10 DIAGNOSIS — M99.02 SEGMENTAL AND SOMATIC DYSFUNCTION OF THORACIC REGION: ICD-10-CM

## 2024-01-10 DIAGNOSIS — G44.86 CERVICOGENIC HEADACHE: ICD-10-CM

## 2024-01-10 DIAGNOSIS — M53.3 SACRAL BACK PAIN: ICD-10-CM

## 2024-01-10 DIAGNOSIS — M24.552 HIP FLEXOR TENDON TIGHTNESS, LEFT: ICD-10-CM

## 2024-01-10 PROCEDURE — 98942 CHIROPRACTIC MANJ 5 REGIONS: CPT | Performed by: CHIROPRACTOR

## 2024-01-10 PROCEDURE — 97140 MANUAL THERAPY 1/> REGIONS: CPT | Performed by: CHIROPRACTOR

## 2024-01-10 ASSESSMENT — ENCOUNTER SYMPTOMS
DIZZINESS: 0
WEAKNESS: 0
CONFUSION: 0
SHORTNESS OF BREATH: 0
DYSURIA: 0
FEVER: 0
VOMITING: 0
APPETITE CHANGE: 0
WOUND: 0
FATIGUE: 0
NAUSEA: 0

## 2024-01-10 NOTE — PROGRESS NOTES
"Subjective   Patient ID: Link Reyes is a 58 y.o. male who presents today for a follow-up treatment of his chronic left sided body pain including the left jaw, left neck, left shoulder, left hip and left sacrum.    This is visit 1 of the 2024 calendar year. [employer].     Fall risk: None. No falls in the last 6 months.     HPI -Visit #2.  He reports that he is extremely happy with the results of his initial chiropractic treatment.  He has maintained increased range of motion in his neck and feels less stiffness and tightness on the left side of the neck and jaw.  However, he feels as if he needs some work on the right side now as the side feels a little tighter than the left with endrange movements.  Additionally, he would like to focus on the left mid back/intrascapular region which always feels \"stuck\".  He does report an incident when he was running and he slipped and fell landing on his back.  He is curious if this may have been the trigger for his pain.  He has had his massage therapist work on it at length without significant relief.  Also, he reports that he felt as if he could walk better after our last treatment but today the left hip and sacrum feel tight again.  Will try to recreate the treatment that was performed to the left hip and sacrum previously.    Patient describes the pain as achiness, constant, sharp, stiffness, tightness, dull, and nagging, and rates the current pain 6 out of 10 (10 being the worst).     INITIAL INTAKE/SUBJECTIVE 12/19/23: he did see Dr. Anson De Leon on 11/30/2023 for this same complaint of chronic left lower extremity tightness and hip pain.  There is also some mild left calf atrophy but this is more likely related to a history of tib-fib fracture.  His flatfoot is slightly more pronounced on the left as well.  An MRI has been ordered of the left hip- this has not yet been scheduled/done. Dr. Graves believes that manipulation of the left SI joint may help with " "his recovery as well as potentially some dry needling to help with myofascial tightness and imbalance.      He explains that his complaints today started approximately 10 years ago.  He did have an issue with a feeling that through his left jaw off and was starting to cause TMJ and trigeminal pain.  This was corrected however \"the damage has been done\".  Since then he has dealt with a lot of tightness in the left jaw, left suboccipital, scalenes, left pec and shoulder.  This is compounded by his left-sided ankle pain which started in the 90s after a left Achilles repair and exacerbated in the early 2000's with his left ankle fracture with hardware.  He recognizes now that the left arch does fall and he tends to pronate the foot, internally rotate the left knee and feels as if he is always twisted to the right.    He does work with a  one-on-one twice a week and is trying desperately to strengthen the left side of his body.  He feels as if it is weak because he is unable to properly engage the muscles.  He has been to the chiropractor once but did not have a true manipulation instead there was some sort of mechanical device that was used.  He did not find any benefit with this.    Given recent antibiotic, 12/8/2023, due to some sinusitis issues.  No other significant health issues.    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  His wife is on the board for  [employer] and children.  He is the father of 3 young adults.    Review of Systems   Constitutional:  Negative for appetite change, fatigue and fever.   HENT:  Negative for congestion and hearing loss.    Eyes:  Negative for visual disturbance.   Respiratory:  Negative for shortness of breath.    Cardiovascular:  Negative for chest pain.   Gastrointestinal:  Negative for nausea and vomiting.        On Uloric for his gout and has not had an attack in a number of years.    Genitourinary:  Negative for dysuria. "   Musculoskeletal:          Tib/fib Fx in 2005 playing kickball with kids.   IMAGING (Dr. Graves): CT of cervical spine from 2019 did show some spondylosis/facet arthropathy worst at C5-6 but no significant stenosis.  MRI brachial plexus from 2022 was normal, and I did personally interpret his lumbar MRI from 2021, which shows mild degenerative changes at L4-5 and L5-S1, there is a small disc bulge at L4-5 with subarticular stenosis but this is more prominent to the right side and does not fit with current symptoms.   Skin:  Negative for rash and wound.   Neurological:  Negative for dizziness and weakness.   Psychiatric/Behavioral:  Negative for confusion.    All other systems reviewed and are negative.      Objective   Observation : normal gait and normal posture. L ankle pronation.     Physical ExamExamination findings (palpation & ROM): Tenderness, hypertonicity and trigger points palpated throughout the R upper trapezius, R levator scapula, left rhomboids, L middle/lower trap, L intercostals, L thoracic paraspinals, left iliolumbar ligament and L hip flexor.  Left heel to buttock was reduced compared to the right with left-sided low back pain and quad stiffness.  No knee pain.    Segmental joint dysfunction was identified in the following areas using motion palpation and/or pain provocation assessment:  Cervical: C0-C4 (Supine)  Thoracic: T4-T7 (Prone)  Lumbopelvic: L4L5/S1, L SIJ (Side-posture and Drop)    Today's treatment:  Performed spinal manipulation to the regions of segmental dysfunction identified on examination using age-appropriate and injury specific force, and manual diversified technique.     Performed seated, supine and prone soft tissue manipulation including IASTM (instrument assisted soft tissue mobilization), MFR (Myofacial Release) and IC (ischemic compression) to the hypertonic muscles including R upper trapezius, R levator scapula, left rhomboids, L middle/lower trap, L intercostals, L  thoracic paraspinals, left iliolumbar ligament to patient tolerance. (Start time 11:40 am , End Time 11:55 am). 1 units.     Additionally, it was advised that he consider purchasing a chirp wheel for thoracic extension/mobilization.  He is open to this.  We also talked about utilizing a couch stretch to open up the left hip flexor to alleviate some of the strain on the left sacrum.    Treatment Plan:   The patient and I discussed the risks and benefits of chiropractic care. Based on the patient's subjective complaints along with the examination findings, it is advised that a course of chiropractic treatment by initiated. Consent for care was given both written and orally by the patient. The patient tolerated today's treatment with little or no additional discomfort and was instructed to contact the office for questions or concerns.     Treatment Frequency: Will see/treat patient every 2-4 weeks as therapeutic benefit is sustained, then frequency will be reduced to as needed for symptom management or as needed for acute flare-ups/exacerbation.     Please note: Voice-to-text software was used when completing this note.  While the note was proofread, portions may include grammatical errors.  Please contact me with any questions/concerns as it relates to these types of errors.

## 2024-01-12 ENCOUNTER — TELEPHONE (OUTPATIENT)
Dept: ORTHOPEDIC SURGERY | Facility: CLINIC | Age: 59
End: 2024-01-12

## 2024-01-12 ENCOUNTER — OFFICE VISIT (OUTPATIENT)
Dept: PRIMARY CARE | Facility: CLINIC | Age: 59
End: 2024-01-12
Payer: COMMERCIAL

## 2024-01-12 VITALS — SYSTOLIC BLOOD PRESSURE: 114 MMHG | OXYGEN SATURATION: 96 % | DIASTOLIC BLOOD PRESSURE: 72 MMHG

## 2024-01-12 DIAGNOSIS — M53.3 SACROILIAC JOINT DYSFUNCTION OF LEFT SIDE: ICD-10-CM

## 2024-01-12 DIAGNOSIS — M25.552 LEFT HIP PAIN: Primary | ICD-10-CM

## 2024-01-12 DIAGNOSIS — R05.2 SUBACUTE COUGH: Primary | ICD-10-CM

## 2024-01-12 PROCEDURE — 99213 OFFICE O/P EST LOW 20 MIN: CPT | Performed by: INTERNAL MEDICINE

## 2024-01-12 RX ORDER — PREDNISONE 10 MG/1
TABLET ORAL
Qty: 12 TABLET | Refills: 0 | Status: SHIPPED | OUTPATIENT
Start: 2024-01-12 | End: 2024-01-17

## 2024-01-12 RX ORDER — BENZONATATE 200 MG/1
200 CAPSULE ORAL 3 TIMES DAILY PRN
Qty: 42 CAPSULE | Refills: 0 | Status: SHIPPED | OUTPATIENT
Start: 2024-01-12 | End: 2024-02-11

## 2024-01-12 NOTE — PATIENT INSTRUCTIONS
It was nice to meet you  I do think the cough is a postinfectious cough from inflammation.  However with your recent history of COVID we are going to obtain an x-ray.  We are going to start prednisone 30 mg for 2 days 20 mg for 2 days 10 mg for 2 days then stop to treat the inflammation.  I have also sent a prescription for Tessalon Perles to the pharmacy which is a cough suppressant to use as needed

## 2024-01-12 NOTE — PROGRESS NOTES
Subjective   Patient ID: Link Reyes is a 58 y.o. male.    HPI  Patient presents today with complaints of cough now ongoing for 3-4 weeks   Had Covid 3 weeks ago  with body aches etc since that time he has had dry  hacking  cough that time he is able to sleep it is worse in the daytime if he talks for any time.  He does not have fevers or chills the cough is making him exhausted just because he keeps coughing        Past medical history significant for hypercholesteremia has not yet started the Vascepa  Gout  Chronic constipation  Hair loss  Low testosterone level uses testosterone gel    Review of Systems    Objective   Physical Exam  Well-developed no acute distress he does seem to have occasional dry cough especially when taking a deep breath or talking  Exam there is mild cerumen in the left ear otherwise normal  Lungs are clear though does cough when trying to take a deep breath  Cardiovascular regular rate and rhythm      Assessment/Plan   #1Cough after having COVID about 3 weeks ago just cannot shake the cough it is dry and hacking exhausting at times just from the cough but no fevers no chills I do think this is postinfectious cough or from the inflammation however we will obtain a chest x-ray I would like him to start prednisone starting with 30 mg decrease by 10 mg every 2 days until complete and Tessalon Perles 200 mg 3 times daily as needed as well he will let us know if further problems or issues  20 minutes were spent with patient of which greater than 50% was spent in counseling and coordination of care  This note was partially generated using the Dragon voice recognition system.  There may be some incorrect wording ,grammar, spelling or punctuation errors that were not corrected prior to committing the note to the medical record.

## 2024-01-12 NOTE — TELEPHONE ENCOUNTER
MRI hip denied due to lack of Xray and conservative care.    Rena - please have him get xray I just ordered (any [facility] location) and make fu visit with me to review his progress with chiropractic.    OR he may just want to pay for MRI out of pocket for MRI, but still would need f/u visit with me to review.

## 2024-01-17 NOTE — TELEPHONE ENCOUNTER
Patient called and message left to inform patient of MRI denial and that a Xray of hip has been ordered that needs to be completed at any  facility. Requested a call back from patient to set up a follow up appointment with Dr. Graves

## 2024-01-18 ENCOUNTER — APPOINTMENT (OUTPATIENT)
Dept: ORTHOPEDIC SURGERY | Facility: CLINIC | Age: 59
End: 2024-01-18
Payer: COMMERCIAL

## 2024-01-18 ENCOUNTER — APPOINTMENT (OUTPATIENT)
Dept: GASTROENTEROLOGY | Facility: EXTERNAL LOCATION | Age: 59
End: 2024-01-18
Payer: COMMERCIAL

## 2024-01-22 ENCOUNTER — APPOINTMENT (OUTPATIENT)
Dept: RADIOLOGY | Facility: HOSPITAL | Age: 59
End: 2024-01-22
Payer: COMMERCIAL

## 2024-01-23 ENCOUNTER — ALLIED HEALTH (OUTPATIENT)
Dept: INTEGRATIVE MEDICINE | Facility: CLINIC | Age: 59
End: 2024-01-23
Payer: COMMERCIAL

## 2024-01-23 ENCOUNTER — HOSPITAL ENCOUNTER (OUTPATIENT)
Dept: RADIOLOGY | Facility: CLINIC | Age: 59
Discharge: HOME | End: 2024-01-23
Payer: COMMERCIAL

## 2024-01-23 DIAGNOSIS — G89.29 CHRONIC LEFT SHOULDER PAIN: ICD-10-CM

## 2024-01-23 DIAGNOSIS — M53.3 SACRAL BACK PAIN: ICD-10-CM

## 2024-01-23 DIAGNOSIS — G44.86 CERVICOGENIC HEADACHE: ICD-10-CM

## 2024-01-23 DIAGNOSIS — M99.02 SEGMENTAL AND SOMATIC DYSFUNCTION OF THORACIC REGION: ICD-10-CM

## 2024-01-23 DIAGNOSIS — M53.3 SACROILIAC JOINT DYSFUNCTION OF LEFT SIDE: ICD-10-CM

## 2024-01-23 DIAGNOSIS — M24.552 HIP FLEXOR TENDON TIGHTNESS, LEFT: ICD-10-CM

## 2024-01-23 DIAGNOSIS — M99.05 SEGMENTAL AND SOMATIC DYSFUNCTION OF PELVIC REGION: ICD-10-CM

## 2024-01-23 DIAGNOSIS — M25.552 LEFT HIP PAIN: ICD-10-CM

## 2024-01-23 DIAGNOSIS — M25.512 CHRONIC LEFT SHOULDER PAIN: ICD-10-CM

## 2024-01-23 DIAGNOSIS — M99.03 SEGMENTAL AND SOMATIC DYSFUNCTION OF LUMBAR REGION: ICD-10-CM

## 2024-01-23 DIAGNOSIS — M99.00 SEGMENTAL AND SOMATIC DYSFUNCTION OF HEAD REGION: ICD-10-CM

## 2024-01-23 DIAGNOSIS — M99.01 SEGMENTAL AND SOMATIC DYSFUNCTION OF CERVICAL REGION: Primary | ICD-10-CM

## 2024-01-23 DIAGNOSIS — M79.10 MYALGIA: ICD-10-CM

## 2024-01-23 DIAGNOSIS — M99.04 SEGMENTAL AND SOMATIC DYSFUNCTION OF SACRAL REGION: ICD-10-CM

## 2024-01-23 DIAGNOSIS — M79.9 POSTURAL STRAIN: ICD-10-CM

## 2024-01-23 DIAGNOSIS — M54.2 NECK PAIN: ICD-10-CM

## 2024-01-23 PROCEDURE — 73502 X-RAY EXAM HIP UNI 2-3 VIEWS: CPT | Mod: LEFT SIDE | Performed by: RADIOLOGY

## 2024-01-23 PROCEDURE — 97140 MANUAL THERAPY 1/> REGIONS: CPT | Performed by: CHIROPRACTOR

## 2024-01-23 PROCEDURE — 98942 CHIROPRACTIC MANJ 5 REGIONS: CPT | Performed by: CHIROPRACTOR

## 2024-01-23 PROCEDURE — 73502 X-RAY EXAM HIP UNI 2-3 VIEWS: CPT | Mod: LT

## 2024-01-23 ASSESSMENT — ENCOUNTER SYMPTOMS
DYSURIA: 0
VOMITING: 0
SHORTNESS OF BREATH: 0
WOUND: 0
APPETITE CHANGE: 0
FATIGUE: 0
CONFUSION: 0
WEAKNESS: 0
DIZZINESS: 0
FEVER: 0
NAUSEA: 0

## 2024-01-23 NOTE — PROGRESS NOTES
"Subjective   Patient ID: Link Reyes is a 58 y.o. male who presents today for a follow-up treatment of his chronic left sided body pain including the left jaw, left neck, left shoulder, left hip and left sacrum.    This is visit 2 of the 2024 calendar year. [employer].     Fall risk: None. No falls in the last 6 months.     HPI -overall he is still adrenally pleased with his treatment and has had significantly less headaches since our last treatment encounter on 1/10/2024.  However he did purchase the Chirp Wheel and reports that it felt amazing while performing the exercises but feels as if he may have \"overdone it\".  He reports that he did 2 different exercises lasting approximately 5 minutes x 2 and the next morning he had an intense left-sided headache again.  He had not had this in some time and was slightly discouraged.  Upon awakening the next morning the headache did resolve.  Again while doing some of his home exercises including a supported squat with a exercise ball on the wall he felt the same headache again.  He is curious as if there is some issues with tightness and scar tissue along the spine causing these issues.  It is my impression that he may have some adhesions attaching to the dura.  Many of his headaches seem as if they are dural tension like in nature/quality.    We will continue to work with soft tissue release (dry needling when appropriate) instrument assisted soft tissue mobilization and manipulation to free up some of these adhesions and restore better biomechanics.  It is advised that he really try the Chirp Wheel but only use it for about 1 minute/day.  He may have overdone it.  It was explained that we do not want to move things too fast.    Patient describes the pain as achiness, constant, sharp, stiffness, tightness, dull, and nagging, and rates the current pain 6 out of 10 (10 being the worst).     Last treatment 1/10/24: Visit #2.  He reports that he is extremely happy with " "the results of his initial chiropractic treatment.  He has maintained increased range of motion in his neck and feels less stiffness and tightness on the left side of the neck and jaw.  However, he feels as if he needs some work on the right side now as the side feels a little tighter than the left with endrange movements.  Additionally, he would like to focus on the left mid back/intrascapular region which always feels \"stuck\".  He does report an incident when he was running and he slipped and fell landing on his back.  He is curious if this may have been the trigger for his pain.  He has had his massage therapist work on it at length without significant relief.  Also, he reports that he felt as if he could walk better after our last treatment but today the left hip and sacrum feel tight again.  Will try to recreate the treatment that was performed to the left hip and sacrum previously.    INITIAL INTAKE/SUBJECTIVE 12/19/23: he did see Dr. Anson De Leon on 11/30/2023 for this same complaint of chronic left lower extremity tightness and hip pain.  There is also some mild left calf atrophy but this is more likely related to a history of tib-fib fracture.  His flatfoot is slightly more pronounced on the left as well.  An MRI has been ordered of the left hip- this has not yet been scheduled/done. Dr. Graves believes that manipulation of the left SI joint may help with his recovery as well as potentially some dry needling to help with myofascial tightness and imbalance.      He explains that his complaints today started approximately 10 years ago.  He did have an issue with a feeling that through his left jaw off and was starting to cause TMJ and trigeminal pain.  This was corrected however \"the damage has been done\".  Since then he has dealt with a lot of tightness in the left jaw, left suboccipital, scalenes, left pec and shoulder.  This is compounded by his left-sided ankle pain which started in the 90s after a " left Achilles repair and exacerbated in the early 2000's with his left ankle fracture with hardware.  He recognizes now that the left arch does fall and he tends to pronate the foot, internally rotate the left knee and feels as if he is always twisted to the right.    He does work with a  one-on-one twice a week and is trying desperately to strengthen the left side of his body.  He feels as if it is weak because he is unable to properly engage the muscles.  He has been to the chiropractor once but did not have a true manipulation instead there was some sort of mechanical device that was used.  He did not find any benefit with this.    Given recent antibiotic, 12/8/2023, due to some sinusitis issues.  No other significant health issues.    Social history: He works in large commercial real estate development.  His wife is on the board for  [employer] and children.  He is the father of 3 young adults.    Review of Systems   Constitutional:  Negative for appetite change, fatigue and fever.   HENT:  Negative for congestion and hearing loss.    Eyes:  Negative for visual disturbance.   Respiratory:  Negative for shortness of breath.    Cardiovascular:  Negative for chest pain.   Gastrointestinal:  Negative for nausea and vomiting.        On Uloric for his gout and has not had an attack in a number of years.    Genitourinary:  Negative for dysuria.   Musculoskeletal:          Tib/fib Fx in 2005 playing kickball with kids.   IMAGING (Dr. Graves): CT of cervical spine from 2019 did show some spondylosis/facet arthropathy worst at C5-6 but no significant stenosis.  MRI brachial plexus from 2022 was normal, and I did personally interpret his lumbar MRI from 2021, which shows mild degenerative changes at L4-5 and L5-S1, there is a small disc bulge at L4-5 with subarticular stenosis but this is more prominent to the right side and does not fit with current symptoms.   Skin:  Negative for rash and wound.    Neurological:  Negative for dizziness and weakness.   Psychiatric/Behavioral:  Negative for confusion.    All other systems reviewed and are negative.    Objective   Observation : normal gait and normal posture. L ankle pronation.     Physical ExamExamination findings (palpation & ROM): Tenderness, hypertonicity and trigger points palpated throughout the R upper trapezius, R levator scapula, left rhomboids, L middle/lower trap, L intercostals, L thoracic paraspinals, left iliolumbar ligament and L hip flexor.  Left heel to buttock was reduced compared to the right with left-sided low back pain and quad stiffness.  No knee pain.    Segmental joint dysfunction was identified in the following areas using motion palpation and/or pain provocation assessment:  Cervical: C0-C4 (Supine)  Thoracic: T4-T7 (Prone)  Lumbopelvic: L4L5/S1, L SIJ (Side-posture and Drop)    Today's treatment:  Performed spinal manipulation to the regions of segmental dysfunction identified on examination using age-appropriate and injury specific force, and manual diversified technique.     Performed seated, supine and prone soft tissue manipulation including IASTM (instrument assisted soft tissue mobilization), MFR (Myofacial Release) and IC (ischemic compression) to the hypertonic muscles including L upper trapezius, L levator scapula, left rhomboids, L middle/lower trap, L intercostals, L thoracic paraspinals, left iliolumbar ligament to patient tolerance. (Start time 1:00 pm , End Time 1:15 pm). 1 units.     Continue chirp wheel for thoracic extension/mobilization (1 minute only).  Continue couch stretch to open up the left hip flexor to alleviate some of the strain on the left sacrum.    Treatment Plan:   The patient and I discussed the risks and benefits of chiropractic care. Based on the patient's subjective complaints along with the examination findings, it is advised that a course of chiropractic treatment by initiated. Consent for care  was given both written and orally by the patient. The patient tolerated today's treatment with little or no additional discomfort and was instructed to contact the office for questions or concerns.     Treatment Frequency: Will see/treat patient every 2-4 weeks as therapeutic benefit is sustained, then frequency will be reduced to as needed for symptom management or as needed for acute flare-ups/exacerbation.     Please note: Voice-to-text software was used when completing this note.  While the note was proofread, portions may include grammatical errors.  Please contact me with any questions/concerns as it relates to these types of errors.

## 2024-02-02 ENCOUNTER — TELEPHONE (OUTPATIENT)
Dept: ORTHOPEDIC SURGERY | Facility: CLINIC | Age: 59
End: 2024-02-02
Payer: COMMERCIAL

## 2024-02-02 NOTE — TELEPHONE ENCOUNTER
Patient called and message left for patient to return call to make a FUV with Dr. Graves to discuss radiology results and plan of care

## 2024-02-12 ENCOUNTER — ALLIED HEALTH (OUTPATIENT)
Dept: INTEGRATIVE MEDICINE | Facility: CLINIC | Age: 59
End: 2024-02-12
Payer: COMMERCIAL

## 2024-02-12 DIAGNOSIS — M99.05 SEGMENTAL AND SOMATIC DYSFUNCTION OF PELVIC REGION: ICD-10-CM

## 2024-02-12 DIAGNOSIS — M99.04 SEGMENTAL AND SOMATIC DYSFUNCTION OF SACRAL REGION: ICD-10-CM

## 2024-02-12 DIAGNOSIS — M99.03 SEGMENTAL AND SOMATIC DYSFUNCTION OF LUMBAR REGION: ICD-10-CM

## 2024-02-12 DIAGNOSIS — M99.02 SEGMENTAL AND SOMATIC DYSFUNCTION OF THORACIC REGION: ICD-10-CM

## 2024-02-12 DIAGNOSIS — G44.86 CERVICOGENIC HEADACHE: ICD-10-CM

## 2024-02-12 DIAGNOSIS — M24.552 HIP FLEXOR TENDON TIGHTNESS, LEFT: ICD-10-CM

## 2024-02-12 DIAGNOSIS — M99.00 SEGMENTAL AND SOMATIC DYSFUNCTION OF HEAD REGION: ICD-10-CM

## 2024-02-12 DIAGNOSIS — M79.10 MYALGIA: ICD-10-CM

## 2024-02-12 DIAGNOSIS — M79.9 POSTURAL STRAIN: ICD-10-CM

## 2024-02-12 DIAGNOSIS — M99.01 SEGMENTAL AND SOMATIC DYSFUNCTION OF CERVICAL REGION: Primary | ICD-10-CM

## 2024-02-12 DIAGNOSIS — M25.512 CHRONIC LEFT SHOULDER PAIN: ICD-10-CM

## 2024-02-12 DIAGNOSIS — M54.2 NECK PAIN: ICD-10-CM

## 2024-02-12 DIAGNOSIS — G89.29 CHRONIC LEFT SHOULDER PAIN: ICD-10-CM

## 2024-02-12 DIAGNOSIS — M53.3 SACRAL BACK PAIN: ICD-10-CM

## 2024-02-12 DIAGNOSIS — M25.552 PAIN OF LEFT HIP: ICD-10-CM

## 2024-02-12 PROCEDURE — 97140 MANUAL THERAPY 1/> REGIONS: CPT | Performed by: CHIROPRACTOR

## 2024-02-12 PROCEDURE — 98942 CHIROPRACTIC MANJ 5 REGIONS: CPT | Performed by: CHIROPRACTOR

## 2024-02-12 ASSESSMENT — ENCOUNTER SYMPTOMS
NAUSEA: 0
FEVER: 0
FATIGUE: 0
APPETITE CHANGE: 0
DIZZINESS: 0
DYSURIA: 0
SHORTNESS OF BREATH: 0
VOMITING: 0
WEAKNESS: 0
WOUND: 0
CONFUSION: 0

## 2024-02-12 NOTE — PROGRESS NOTES
"Subjective   Patient ID: Link Reyes is a 58 y.o. male who presents today for a follow-up treatment of his chronic left sided body pain including the left jaw, left neck, left shoulder, left hip and left sacrum.    This is visit 3 of the 2024 calendar year. [employer].     Fall risk: None. No falls in the last 6 months.     HPI -he continues to have incremental benefit with each successive chiropractic treatment.  The headaches are significantly less and he feels that he has a greater range of motion in the neck and cervical region overall.  Unfortunately the left sacrum and leg symptoms are also improved, but tend to be very temporary compared to the neck results.  He knows that this has something to do with his prior left ankle injury and gait derangement but we will continue to work on this to see if we can make more lasting benefit.    Patient describes the pain as achiness, constant, sharp, stiffness, tightness, dull, and nagging, and rates the current pain 5 out of 10 (10 being the worst).     Last treatment 1/23/24: overall he is still extremely pleased with his treatment and has had significantly less headaches since our last treatment encounter on 1/10/2024.  However he did purchase the Chirp Wheel and reports that it felt amazing while performing the exercises but feels as if he may have \"overdone it\".  He reports that he did 2 different exercises lasting approximately 5 minutes x 2 and the next morning he had an intense left-sided headache again.  He had not had this in some time and was slightly discouraged.  Upon awakening the next morning the headache did resolve.  Again while doing some of his home exercises including a supported squat with a exercise ball on the wall he felt the same headache again.  He is curious as if there is some issues with tightness and scar tissue along the spine causing these issues.  It is my impression that he may have some adhesions attaching to the dura.  Many of " "his headaches seem as if they are dural tension like in nature/quality.    We will continue to work with soft tissue release (dry needling when appropriate) instrument assisted soft tissue mobilization and manipulation to free up some of these adhesions and restore better biomechanics.  It is advised that he really try the Chirp Wheel but only use it for about 1 minute/day.  He may have overdone it.  It was explained that we do not want to move things too fast.    1/10/24: Visit #2.  He reports that he is extremely happy with the results of his initial chiropractic treatment.  He has maintained increased range of motion in his neck and feels less stiffness and tightness on the left side of the neck and jaw.  However, he feels as if he needs some work on the right side now as the side feels a little tighter than the left with endrange movements.  Additionally, he would like to focus on the left mid back/intrascapular region which always feels \"stuck\".  He does report an incident when he was running and he slipped and fell landing on his back.  He is curious if this may have been the trigger for his pain.  He has had his massage therapist work on it at length without significant relief.  Also, he reports that he felt as if he could walk better after our last treatment but today the left hip and sacrum feel tight again.  Will try to recreate the treatment that was performed to the left hip and sacrum previously.    INITIAL INTAKE/SUBJECTIVE 12/19/23: he did see Dr. Anson De Leon on 11/30/2023 for this same complaint of chronic left lower extremity tightness and hip pain.  There is also some mild left calf atrophy but this is more likely related to a history of tib-fib fracture.  His flatfoot is slightly more pronounced on the left as well.  An MRI has been ordered of the left hip- this has not yet been scheduled/done. Dr. Graves believes that manipulation of the left SI joint may help with his recovery as well as " "potentially some dry needling to help with myofascial tightness and imbalance.      He explains that his complaints today started approximately 10 years ago.  He did have an issue with a feeling that through his left jaw off and was starting to cause TMJ and trigeminal pain.  This was corrected however \"the damage has been done\".  Since then he has dealt with a lot of tightness in the left jaw, left suboccipital, scalenes, left pec and shoulder.  This is compounded by his left-sided ankle pain which started in the 90s after a left Achilles repair and exacerbated in the early 2000's with his left ankle fracture with hardware.  He recognizes now that the left arch does fall and he tends to pronate the foot, internally rotate the left knee and feels as if he is always twisted to the right.    He does work with a  one-on-one twice a week and is trying desperately to strengthen the left side of his body.  He feels as if it is weak because he is unable to properly engage the muscles.  He has been to the chiropractor once but did not have a true manipulation instead there was some sort of mechanical device that was used.  He did not find any benefit with this.    Given recent antibiotic, 12/8/2023, due to some sinusitis issues.  No other significant health issues.    Social history: He works in large commercial real estate development.  His wife is on the board for  [employer] babies and children.  He is the father of 3 young adults.    Review of Systems   Constitutional:  Negative for appetite change, fatigue and fever.   HENT:  Negative for congestion and hearing loss.    Eyes:  Negative for visual disturbance.   Respiratory:  Negative for shortness of breath.    Cardiovascular:  Negative for chest pain.   Gastrointestinal:  Negative for nausea and vomiting.        On Uloric for his gout and has not had an attack in a number of years.    Genitourinary:  Negative for dysuria.   Musculoskeletal:          " Tib/fib Fx in 2005 playing kickball with kids.   IMAGING (Dr. Graves): CT of cervical spine from 2019 did show some spondylosis/facet arthropathy worst at C5-6 but no significant stenosis.  MRI brachial plexus from 2022 was normal, and I did personally interpret his lumbar MRI from 2021, which shows mild degenerative changes at L4-5 and L5-S1, there is a small disc bulge at L4-5 with subarticular stenosis but this is more prominent to the right side and does not fit with current symptoms.   Skin:  Negative for rash and wound.   Neurological:  Negative for dizziness and weakness.   Psychiatric/Behavioral:  Negative for confusion.    All other systems reviewed and are negative.    Objective   Observation : normal gait and normal posture. L ankle pronation.     Physical ExamExamination findings (palpation & ROM): Tenderness, hypertonicity and trigger points palpated throughout the R upper trapezius, R levator scapula, left rhomboids, L middle/lower trap, L intercostals, L thoracic paraspinals, left iliolumbar ligament and L hip flexor.  Left heel to buttock was reduced compared to the right with left-sided low back pain and quad stiffness.  No knee pain.    Segmental joint dysfunction was identified in the following areas using motion palpation and/or pain provocation assessment:  Cervical: C0-C4 (Supine)  Thoracic: T4-T7 (Prone)  Lumbopelvic: L4L5/S1, L SIJ (Side-posture and Drop)    Today's treatment:  Performed spinal manipulation to the regions of segmental dysfunction identified on examination using age-appropriate and injury specific force, and manual diversified technique.     Performed seated, supine and prone soft tissue manipulation including IASTM (instrument assisted soft tissue mobilization), MFR (Myofacial Release) and IC (ischemic compression) to the hypertonic muscles including L upper trapezius, L levator scapula, left rhomboids, L middle/lower trap, L intercostals, L thoracic paraspinals, left  iliolumbar ligament to patient tolerance.  Dry needling to the left cervical paraspinals, left upper trapezius and left iliolumbar ligament/upper gluteal region (15 in, 15 ) (Start time 2:00 pm , End Time 2:20 pm). 1 units.     Continue chirp wheel for thoracic extension/mobilization (1 minute only).  Continue couch stretch to open up the left hip flexor to alleviate some of the strain on the left sacrum.    Treatment Plan:   The patient and I discussed the risks and benefits of chiropractic care. Based on the patient's subjective complaints along with the examination findings, it is advised that a course of chiropractic treatment by initiated. Consent for care was given both written and orally by the patient. The patient tolerated today's treatment with little or no additional discomfort and was instructed to contact the office for questions or concerns.     Treatment Frequency: Will see/treat patient every 2-4 weeks as therapeutic benefit is sustained, then frequency will be reduced to as needed for symptom management or as needed for acute flare-ups/exacerbation.     Please note: Voice-to-text software was used when completing this note.  While the note was proofread, portions may include grammatical errors.  Please contact me with any questions/concerns as it relates to these types of errors.

## 2024-03-04 ENCOUNTER — ALLIED HEALTH (OUTPATIENT)
Dept: INTEGRATIVE MEDICINE | Facility: CLINIC | Age: 59
End: 2024-03-04
Payer: COMMERCIAL

## 2024-03-04 DIAGNOSIS — M54.2 NECK PAIN: ICD-10-CM

## 2024-03-04 DIAGNOSIS — M25.552 PAIN OF LEFT HIP: ICD-10-CM

## 2024-03-04 DIAGNOSIS — M79.10 MYALGIA: ICD-10-CM

## 2024-03-04 DIAGNOSIS — G89.29 CHRONIC LEFT SHOULDER PAIN: ICD-10-CM

## 2024-03-04 DIAGNOSIS — M24.552 HIP FLEXOR TENDON TIGHTNESS, LEFT: ICD-10-CM

## 2024-03-04 DIAGNOSIS — M99.03 SEGMENTAL AND SOMATIC DYSFUNCTION OF LUMBAR REGION: ICD-10-CM

## 2024-03-04 DIAGNOSIS — M99.00 SEGMENTAL AND SOMATIC DYSFUNCTION OF HEAD REGION: ICD-10-CM

## 2024-03-04 DIAGNOSIS — M99.04 SEGMENTAL AND SOMATIC DYSFUNCTION OF SACRAL REGION: ICD-10-CM

## 2024-03-04 DIAGNOSIS — M99.01 SEGMENTAL AND SOMATIC DYSFUNCTION OF CERVICAL REGION: Primary | ICD-10-CM

## 2024-03-04 DIAGNOSIS — M79.9 POSTURAL STRAIN: ICD-10-CM

## 2024-03-04 DIAGNOSIS — M53.3 SACRAL BACK PAIN: ICD-10-CM

## 2024-03-04 DIAGNOSIS — M99.02 SEGMENTAL AND SOMATIC DYSFUNCTION OF THORACIC REGION: ICD-10-CM

## 2024-03-04 DIAGNOSIS — M99.05 SEGMENTAL AND SOMATIC DYSFUNCTION OF PELVIC REGION: ICD-10-CM

## 2024-03-04 DIAGNOSIS — G44.86 CERVICOGENIC HEADACHE: ICD-10-CM

## 2024-03-04 DIAGNOSIS — M25.512 CHRONIC LEFT SHOULDER PAIN: ICD-10-CM

## 2024-03-04 PROCEDURE — 98942 CHIROPRACTIC MANJ 5 REGIONS: CPT | Performed by: CHIROPRACTOR

## 2024-03-04 PROCEDURE — 97140 MANUAL THERAPY 1/> REGIONS: CPT | Performed by: CHIROPRACTOR

## 2024-03-04 ASSESSMENT — ENCOUNTER SYMPTOMS
WEAKNESS: 0
DIZZINESS: 0
WOUND: 0
APPETITE CHANGE: 0
SHORTNESS OF BREATH: 0
VOMITING: 0
FATIGUE: 0
CONFUSION: 0
DYSURIA: 0
NAUSEA: 0
FEVER: 0

## 2024-03-04 NOTE — PROGRESS NOTES
"Subjective   Patient ID: Link Reyes is a 58 y.o. male who presents today for a follow-up treatment of his chronic left sided body pain including the left jaw, left neck, left shoulder, left hip and left sacrum.    This is visit 4 of the 2024 calendar year. [employer].     Fall risk: None. No falls in the last 6 months.     HPI -he continues to be quite pleased with his treatments.  Again he would like to focus on the left neck and scapula as well as the left piriformis and glutes.  He continues to have stiffness and soreness of the left foot after his ankle surgery and injury but has been doing a little more self-care at home rolling on a double ball.    Last treatment 2/12/24: he continues to have incremental benefit with each successive chiropractic treatment.  The headaches are significantly less and he feels that he has a greater range of motion in the neck and cervical region overall.  Unfortunately the left sacrum and leg symptoms are also improved, but tend to be very temporary compared to the neck results.  He knows that this has something to do with his prior left ankle injury and gait derangement but we will continue to work on this to see if we can make more lasting benefit.    1/23/24: overall he is still extremely pleased with his treatment and has had significantly less headaches since our last treatment encounter on 1/10/2024.  However he did purchase the Chirp Wheel and reports that it felt amazing while performing the exercises but feels as if he may have \"overdone it\".  He reports that he did 2 different exercises lasting approximately 5 minutes x 2 and the next morning he had an intense left-sided headache again.  He had not had this in some time and was slightly discouraged.  Upon awakening the next morning the headache did resolve.  Again while doing some of his home exercises including a supported squat with a exercise ball on the wall he felt the same headache again.  He is curious as " "if there is some issues with tightness and scar tissue along the spine causing these issues.  It is my impression that he may have some adhesions attaching to the dura.  Many of his headaches seem as if they are dural tension like in nature/quality.    We will continue to work with soft tissue release (dry needling when appropriate) instrument assisted soft tissue mobilization and manipulation to free up some of these adhesions and restore better biomechanics.  It is advised that he really try the Chirp Wheel but only use it for about 1 minute/day.  He may have overdone it.  It was explained that we do not want to move things too fast.    1/10/24: Visit #2.  He reports that he is extremely happy with the results of his initial chiropractic treatment.  He has maintained increased range of motion in his neck and feels less stiffness and tightness on the left side of the neck and jaw.  However, he feels as if he needs some work on the right side now as the side feels a little tighter than the left with endrange movements.  Additionally, he would like to focus on the left mid back/intrascapular region which always feels \"stuck\".  He does report an incident when he was running and he slipped and fell landing on his back.  He is curious if this may have been the trigger for his pain.  He has had his massage therapist work on it at length without significant relief.  Also, he reports that he felt as if he could walk better after our last treatment but today the left hip and sacrum feel tight again.  Will try to recreate the treatment that was performed to the left hip and sacrum previously.    INITIAL INTAKE/SUBJECTIVE 12/19/23: he did see Dr. Anson De Leon on 11/30/2023 for this same complaint of chronic left lower extremity tightness and hip pain.  There is also some mild left calf atrophy but this is more likely related to a history of tib-fib fracture.  His flatfoot is slightly more pronounced on the left as well.  " "An MRI has been ordered of the left hip- this has not yet been scheduled/done. Dr. Graves believes that manipulation of the left SI joint may help with his recovery as well as potentially some dry needling to help with myofascial tightness and imbalance.      He explains that his complaints today started approximately 10 years ago.  He did have an issue with a feeling that through his left jaw off and was starting to cause TMJ and trigeminal pain.  This was corrected however \"the damage has been done\".  Since then he has dealt with a lot of tightness in the left jaw, left suboccipital, scalenes, left pec and shoulder.  This is compounded by his left-sided ankle pain which started in the 90s after a left Achilles repair and exacerbated in the early 2000's with his left ankle fracture with hardware.  He recognizes now that the left arch does fall and he tends to pronate the foot, internally rotate the left knee and feels as if he is always twisted to the right.    He does work with a  one-on-one twice a week and is trying desperately to strengthen the left side of his body.  He feels as if it is weak because he is unable to properly engage the muscles.  He has been to the chiropractor once but did not have a true manipulation instead there was some sort of mechanical device that was used.  He did not find any benefit with this.    Given recent antibiotic, 12/8/2023, due to some sinusitis issues.  No other significant health issues.    Social history: He works in large commercial real estate development.  His wife is on the board for  [employer] and children.  He is the father of 3 young adults.    Review of Systems   Constitutional:  Negative for appetite change, fatigue and fever.   HENT:  Negative for congestion and hearing loss.    Eyes:  Negative for visual disturbance.   Respiratory:  Negative for shortness of breath.    Cardiovascular:  Negative for chest pain.   Gastrointestinal:  " Negative for nausea and vomiting.        On Uloric for his gout and has not had an attack in a number of years.    Genitourinary:  Negative for dysuria.   Musculoskeletal:          Tib/fib Fx in 2005 playing kickball with kids.   IMAGING (Dr. Graves): CT of cervical spine from 2019 did show some spondylosis/facet arthropathy worst at C5-6 but no significant stenosis.  MRI brachial plexus from 2022 was normal, and I did personally interpret his lumbar MRI from 2021, which shows mild degenerative changes at L4-5 and L5-S1, there is a small disc bulge at L4-5 with subarticular stenosis but this is more prominent to the right side and does not fit with current symptoms.   Skin:  Negative for rash and wound.   Neurological:  Negative for dizziness and weakness.   Psychiatric/Behavioral:  Negative for confusion.    All other systems reviewed and are negative.    Objective   Observation : normal gait and normal posture. L ankle pronation.     Physical ExamExamination findings (palpation & ROM): Tenderness, hypertonicity and trigger points palpated throughout the R upper trapezius, R levator scapula, left rhomboids, L middle/lower trap, L intercostals, L thoracic paraspinals, left iliolumbar ligament and L hip flexor.  Left heel to buttock was reduced compared to the right with left-sided low back pain and quad stiffness.  No knee pain.    Segmental joint dysfunction was identified in the following areas using motion palpation and/or pain provocation assessment:  Cervical: C0-C4 (Supine)  Thoracic: T4-T7 (Prone)  Lumbopelvic: L4L5/S1, L SIJ (Side-posture and Drop)    Today's treatment:  Performed spinal manipulation to the regions of segmental dysfunction identified on examination using age-appropriate and injury specific force, and manual diversified technique.     Performed seated, supine and prone soft tissue manipulation including IASTM (instrument assisted soft tissue mobilization), MFR (Myofacial Release) and IC  (ischemic compression) to the hypertonic muscles including L upper trapezius, L levator scapula, left rhomboids, L middle/lower trap, L intercostals, L thoracic paraspinals, left iliolumbar ligament to patient tolerance.  Dry needling to the left cervical paraspinals, left upper trapezius and left iliolumbar ligament/upper gluteal region (15 in, 15 ) (Start time 1:20 pm , End Time 1:40 pm). 1 units.     Continue chirp wheel for thoracic extension/mobilization (1 minute only).  Continue couch stretch to open up the left hip flexor to alleviate some of the strain on the left sacrum.    Treatment Plan:   The patient and I discussed the risks and benefits of chiropractic care. Based on the patient's subjective complaints along with the examination findings, it is advised that a course of chiropractic treatment by initiated. Consent for care was given both written and orally by the patient. The patient tolerated today's treatment with little or no additional discomfort and was instructed to contact the office for questions or concerns.     Treatment Frequency: Will see/treat patient every 2-4 weeks as therapeutic benefit is sustained, then frequency will be reduced to as needed for symptom management or as needed for acute flare-ups/exacerbation.     Please note: Voice-to-text software was used when completing this note.  While the note was proofread, portions may include grammatical errors.  Please contact me with any questions/concerns as it relates to these types of errors.

## 2024-03-28 ASSESSMENT — ENCOUNTER SYMPTOMS
CONFUSION: 0
WOUND: 0
SHORTNESS OF BREATH: 0
NAUSEA: 0
DIZZINESS: 0
WEAKNESS: 0
FEVER: 0
APPETITE CHANGE: 0
FATIGUE: 0
DYSURIA: 0
VOMITING: 0

## 2024-03-28 NOTE — PROGRESS NOTES
Subjective   Patient ID: Link Reyes is a 58 y.o. male who presents today for a follow-up treatment of his chronic left sided body pain including the left jaw, left neck, left shoulder, left hip and left sacrum.    This is visit 5 of the 2024 calendar year. [employer].     Fall risk: None. No falls in the last 6 months.     HPI -he continues to be extremely happy with his chiropractic care.  He feels as if he is able to settle into a better upright posture and is able to engage his posterior lower scapular muscles better than before.  Mechanically he is feeling a little more balanced.  Today we will continue to work on the left neck and trap, left scapular region as well as the left lower back and hip.  We will also check the quadratus lumborum which would be a reasonable connection between the rib cage and shoulder to the hip and lower back.  He explains that no one has specifically ever worked on this area before.  Otherwise no changes to his medical history.  No new symptoms to report.  He does continue with his Pilates, massage and stretching.    Last treatment 3/4/24: he continues to be quite pleased with his treatments.  Again he would like to focus on the left neck and scapula as well as the left piriformis and glutes.  He continues to have stiffness and soreness of the left foot after his ankle surgery and injury but has been doing a little more self-care at home rolling on a double ball.    2/12/24: he continues to have incremental benefit with each successive chiropractic treatment.  The headaches are significantly less and he feels that he has a greater range of motion in the neck and cervical region overall.  Unfortunately the left sacrum and leg symptoms are also improved, but tend to be very temporary compared to the neck results.  He knows that this has something to do with his prior left ankle injury and gait derangement but we will continue to work on this to see if we can make more lasting  "benefit.    1/23/24: overall he is still extremely pleased with his treatment and has had significantly less headaches since our last treatment encounter on 1/10/2024.  However he did purchase the Chirp Wheel and reports that it felt amazing while performing the exercises but feels as if he may have \"overdone it\".  He reports that he did 2 different exercises lasting approximately 5 minutes x 2 and the next morning he had an intense left-sided headache again.  He had not had this in some time and was slightly discouraged.  Upon awakening the next morning the headache did resolve.  Again while doing some of his home exercises including a supported squat with a exercise ball on the wall he felt the same headache again.  He is curious as if there is some issues with tightness and scar tissue along the spine causing these issues.  It is my impression that he may have some adhesions attaching to the dura.  Many of his headaches seem as if they are dural tension like in nature/quality.    We will continue to work with soft tissue release (dry needling when appropriate) instrument assisted soft tissue mobilization and manipulation to free up some of these adhesions and restore better biomechanics.  It is advised that he really try the Chirp Wheel but only use it for about 1 minute/day.  He may have overdone it.  It was explained that we do not want to move things too fast.    1/10/24: Visit #2.  He reports that he is extremely happy with the results of his initial chiropractic treatment.  He has maintained increased range of motion in his neck and feels less stiffness and tightness on the left side of the neck and jaw.  However, he feels as if he needs some work on the right side now as the side feels a little tighter than the left with endrange movements.  Additionally, he would like to focus on the left mid back/intrascapular region which always feels \"stuck\".  He does report an incident when he was running and he " "slipped and fell landing on his back.  He is curious if this may have been the trigger for his pain.  He has had his massage therapist work on it at length without significant relief.  Also, he reports that he felt as if he could walk better after our last treatment but today the left hip and sacrum feel tight again.  Will try to recreate the treatment that was performed to the left hip and sacrum previously.    INITIAL INTAKE/SUBJECTIVE 12/19/23: he did see Dr. Anson De Leon on 11/30/2023 for this same complaint of chronic left lower extremity tightness and hip pain.  There is also some mild left calf atrophy but this is more likely related to a history of tib-fib fracture.  His flatfoot is slightly more pronounced on the left as well.  An MRI has been ordered of the left hip- this has not yet been scheduled/done. Dr. Graves believes that manipulation of the left SI joint may help with his recovery as well as potentially some dry needling to help with myofascial tightness and imbalance.      He explains that his complaints today started approximately 10 years ago.  He did have an issue with a feeling that through his left jaw off and was starting to cause TMJ and trigeminal pain.  This was corrected however \"the damage has been done\".  Since then he has dealt with a lot of tightness in the left jaw, left suboccipital, scalenes, left pec and shoulder.  This is compounded by his left-sided ankle pain which started in the 90s after a left Achilles repair and exacerbated in the early 2000's with his left ankle fracture with hardware.  He recognizes now that the left arch does fall and he tends to pronate the foot, internally rotate the left knee and feels as if he is always twisted to the right.    He does work with a  one-on-one twice a week and is trying desperately to strengthen the left side of his body.  He feels as if it is weak because he is unable to properly engage the muscles.  He has " been to the chiropractor once but did not have a true manipulation instead there was some sort of mechanical device that was used.  He did not find any benefit with this.    Given recent antibiotic, 12/8/2023, due to some sinusitis issues.  No other significant health issues.    Social history: He works in large commercial real estate development.  His wife is on the board for  [employer] and children.  He is the father of 3 young adults.    Review of Systems   Constitutional:  Negative for appetite change, fatigue and fever.   HENT:  Negative for congestion and hearing loss.    Eyes:  Negative for visual disturbance.   Respiratory:  Negative for shortness of breath.    Cardiovascular:  Negative for chest pain.   Gastrointestinal:  Negative for nausea and vomiting.        On Uloric for his gout and has not had an attack in a number of years.    Genitourinary:  Negative for dysuria.   Musculoskeletal:          Tib/fib Fx in 2005 playing kickball with kids.   IMAGING (Dr. Graves): CT of cervical spine from 2019 did show some spondylosis/facet arthropathy worst at C5-6 but no significant stenosis.  MRI brachial plexus from 2022 was normal, and I did personally interpret his lumbar MRI from 2021, which shows mild degenerative changes at L4-5 and L5-S1, there is a small disc bulge at L4-5 with subarticular stenosis but this is more prominent to the right side and does not fit with current symptoms.   Skin:  Negative for rash and wound.   Neurological:  Negative for dizziness and weakness.   Psychiatric/Behavioral:  Negative for confusion.    All other systems reviewed and are negative.    Hip Xrays 1/2024: BL mild OA hips    Objective   Observation : normal gait and normal posture. L ankle pronation.     Physical Exam    Examination findings (palpation & ROM): Tenderness, hypertonicity and trigger points palpated throughout the R upper trapezius, R levator scapula, left rhomboids, L middle/lower trap, L  intercostals, L thoracic paraspinals,L QL,  left iliolumbar ligament and L hip flexor.  Left heel to buttock was reduced compared to the right with left-sided low back pain and quad stiffness.  No knee pain.    Segmental joint dysfunction was identified in the following areas using motion palpation and/or pain provocation assessment:  Cervical: C0-C4 (Supine)  Thoracic: T4-T7 (Prone)  Lumbopelvic: L4L5/S1, L SIJ (Side-posture and Drop)    Today's treatment:  Performed spinal manipulation to the regions of segmental dysfunction identified on examination using age-appropriate and injury specific force, and manual diversified technique.     Performed seated, supine and prone soft tissue manipulation including IASTM (instrument assisted soft tissue mobilization), MFR (Myofacial Release) and IC (ischemic compression) to the hypertonic muscles including L upper trapezius, L levator scapula, left rhomboids, L middle/lower trap, L intercostals, L QL, L thoracic paraspinals, left iliolumbar ligament to patient tolerance.  Dry needling to the left cervical paraspinals, left upper trapezius and left iliolumbar ligament/upper gluteal region (20 in, 20 out) (Start time 11:00 am , End Time 11:20 am). 1 units.     Continue chirp wheel for thoracic extension/mobilization (1 minute only).  Continue couch stretch to open up the left hip flexor to alleviate some of the strain on the left sacrum.    Treatment Plan:   The patient and I discussed the risks and benefits of chiropractic care. Based on the patient's subjective complaints along with the examination findings, it is advised that a course of chiropractic treatment by initiated. Consent for care was given both written and orally by the patient. The patient tolerated today's treatment with little or no additional discomfort and was instructed to contact the office for questions or concerns.     Treatment Frequency: Will see/treat patient every 2-4 weeks as therapeutic benefit is  sustained, then frequency will be reduced to as needed for symptom management or as needed for acute flare-ups/exacerbation.     Please note: Voice-to-text software was used when completing this note.  While the note was proofread, portions may include grammatical errors.  Please contact me with any questions/concerns as it relates to these types of errors.

## 2024-03-29 ENCOUNTER — ALLIED HEALTH (OUTPATIENT)
Dept: INTEGRATIVE MEDICINE | Facility: CLINIC | Age: 59
End: 2024-03-29
Payer: COMMERCIAL

## 2024-03-29 DIAGNOSIS — M79.10 MYALGIA: ICD-10-CM

## 2024-03-29 DIAGNOSIS — M25.512 CHRONIC LEFT SHOULDER PAIN: ICD-10-CM

## 2024-03-29 DIAGNOSIS — M25.552 PAIN OF LEFT HIP: ICD-10-CM

## 2024-03-29 DIAGNOSIS — M99.05 SEGMENTAL AND SOMATIC DYSFUNCTION OF PELVIC REGION: ICD-10-CM

## 2024-03-29 DIAGNOSIS — N52.9 MALE ERECTILE DISORDER: Primary | ICD-10-CM

## 2024-03-29 DIAGNOSIS — M99.04 SEGMENTAL AND SOMATIC DYSFUNCTION OF SACRAL REGION: ICD-10-CM

## 2024-03-29 DIAGNOSIS — M79.9 POSTURAL STRAIN: ICD-10-CM

## 2024-03-29 DIAGNOSIS — M99.03 SEGMENTAL AND SOMATIC DYSFUNCTION OF LUMBAR REGION: ICD-10-CM

## 2024-03-29 DIAGNOSIS — M54.2 NECK PAIN: ICD-10-CM

## 2024-03-29 DIAGNOSIS — E29.1 HYPOGONADISM MALE: ICD-10-CM

## 2024-03-29 DIAGNOSIS — M99.01 SEGMENTAL AND SOMATIC DYSFUNCTION OF CERVICAL REGION: Primary | ICD-10-CM

## 2024-03-29 DIAGNOSIS — G89.29 CHRONIC LEFT SHOULDER PAIN: ICD-10-CM

## 2024-03-29 DIAGNOSIS — M99.00 SEGMENTAL AND SOMATIC DYSFUNCTION OF HEAD REGION: ICD-10-CM

## 2024-03-29 DIAGNOSIS — M99.02 SEGMENTAL AND SOMATIC DYSFUNCTION OF THORACIC REGION: ICD-10-CM

## 2024-03-29 DIAGNOSIS — M53.3 SACRAL BACK PAIN: ICD-10-CM

## 2024-03-29 DIAGNOSIS — G44.86 CERVICOGENIC HEADACHE: ICD-10-CM

## 2024-03-29 DIAGNOSIS — M24.552 HIP FLEXOR TENDON TIGHTNESS, LEFT: ICD-10-CM

## 2024-03-29 PROCEDURE — 98942 CHIROPRACTIC MANJ 5 REGIONS: CPT | Performed by: CHIROPRACTOR

## 2024-03-29 PROCEDURE — 97140 MANUAL THERAPY 1/> REGIONS: CPT | Performed by: CHIROPRACTOR

## 2024-03-29 RX ORDER — TESTOSTERONE 20.25 MG/1.25G
2 GEL TOPICAL DAILY
Qty: 75 G | Refills: 5 | Status: SHIPPED | OUTPATIENT
Start: 2024-03-29

## 2024-03-29 RX ORDER — TADALAFIL 5 MG/1
5 TABLET ORAL DAILY
Qty: 15 TABLET | Refills: 11 | Status: SHIPPED | OUTPATIENT
Start: 2024-03-29

## 2024-04-04 ENCOUNTER — ALLIED HEALTH (OUTPATIENT)
Dept: INTEGRATIVE MEDICINE | Facility: CLINIC | Age: 59
End: 2024-04-04
Payer: COMMERCIAL

## 2024-04-04 DIAGNOSIS — M25.552 PAIN OF LEFT HIP: ICD-10-CM

## 2024-04-04 DIAGNOSIS — M53.3 SACRAL BACK PAIN: ICD-10-CM

## 2024-04-04 DIAGNOSIS — G89.29 CHRONIC LEFT SHOULDER PAIN: ICD-10-CM

## 2024-04-04 DIAGNOSIS — M99.04 SEGMENTAL AND SOMATIC DYSFUNCTION OF SACRAL REGION: ICD-10-CM

## 2024-04-04 DIAGNOSIS — M24.552 HIP FLEXOR TENDON TIGHTNESS, LEFT: ICD-10-CM

## 2024-04-04 DIAGNOSIS — M99.02 SEGMENTAL AND SOMATIC DYSFUNCTION OF THORACIC REGION: ICD-10-CM

## 2024-04-04 DIAGNOSIS — M99.03 SEGMENTAL AND SOMATIC DYSFUNCTION OF LUMBAR REGION: ICD-10-CM

## 2024-04-04 DIAGNOSIS — M99.05 SEGMENTAL AND SOMATIC DYSFUNCTION OF PELVIC REGION: ICD-10-CM

## 2024-04-04 DIAGNOSIS — M99.01 SEGMENTAL AND SOMATIC DYSFUNCTION OF CERVICAL REGION: Primary | ICD-10-CM

## 2024-04-04 DIAGNOSIS — M79.9 POSTURAL STRAIN: ICD-10-CM

## 2024-04-04 DIAGNOSIS — M25.512 CHRONIC LEFT SHOULDER PAIN: ICD-10-CM

## 2024-04-04 DIAGNOSIS — M99.00 SEGMENTAL AND SOMATIC DYSFUNCTION OF HEAD REGION: ICD-10-CM

## 2024-04-04 DIAGNOSIS — G44.86 CERVICOGENIC HEADACHE: ICD-10-CM

## 2024-04-04 DIAGNOSIS — M54.2 NECK PAIN: ICD-10-CM

## 2024-04-04 DIAGNOSIS — M79.10 MYALGIA: ICD-10-CM

## 2024-04-04 PROCEDURE — 97140 MANUAL THERAPY 1/> REGIONS: CPT | Performed by: CHIROPRACTOR

## 2024-04-04 PROCEDURE — 98942 CHIROPRACTIC MANJ 5 REGIONS: CPT | Performed by: CHIROPRACTOR

## 2024-04-04 ASSESSMENT — ENCOUNTER SYMPTOMS
DIZZINESS: 0
APPETITE CHANGE: 0
FEVER: 0
CONFUSION: 0
SHORTNESS OF BREATH: 0
WEAKNESS: 0
FATIGUE: 0
DYSURIA: 0
VOMITING: 0
WOUND: 0
NAUSEA: 0

## 2024-04-04 NOTE — PROGRESS NOTES
Subjective   Patient ID: Link Reyes is a 58 y.o. male who presents today for a follow-up treatment of his chronic left sided body pain including the left jaw, left neck, left shoulder, left hip and left sacrum.    This is visit 6 of the 2024 calendar year. [employer].     Fall risk: None. No falls in the last 6 months.     HPI -Kain reports that the treatment continues to make incremental improvement.  He is finding that he is able to stand more upright which alleviates a lot of tension and weight.  He is being more aware of pulling the shoulder blades down and back which offloads some of the tension he feels in the upper trapezius neck and jaw.  He did have 1 headache but it was temporary since her last treatment encounter, and this was directly related to doing wall squats.  He would like to continue with the same treatment protocol as it has been proving beneficial.  He will continue to work on stabilizing the left scapula.  This is also the same region but a lipoma was removed and there is a large scar at the lower/inferior border of the scapula.  It is unknown how much this will affect his ability to recruit muscle fibers, however we discussed that other muscles around the scar should be able to compensate.  We discussed some other exercises that he can try such as wall walks to help stabilize the shoulder blades and strengthen the shoulders as well as active and passive hanging from a pull-up bar.  He will try these and report back.    Last treatment 3/29/24: he continues to be extremely happy with his chiropractic care.  He feels as if he is able to settle into a better upright posture and is able to engage his posterior lower scapular muscles better than before.  Mechanically he is feeling a little more balanced.  Today we will continue to work on the left neck and trap, left scapular region as well as the left lower back and hip.  We will also check the quadratus lumborum which would be a  "reasonable connection between the rib cage and shoulder to the hip and lower back.  He explains that no one has specifically ever worked on this area before.  Otherwise no changes to his medical history.  No new symptoms to report.  He does continue with his Pilates, massage and stretching.    3/4/24: he continues to be quite pleased with his treatments.  Again he would like to focus on the left neck and scapula as well as the left piriformis and glutes.  He continues to have stiffness and soreness of the left foot after his ankle surgery and injury but has been doing a little more self-care at home rolling on a double ball.    2/12/24: he continues to have incremental benefit with each successive chiropractic treatment.  The headaches are significantly less and he feels that he has a greater range of motion in the neck and cervical region overall.  Unfortunately the left sacrum and leg symptoms are also improved, but tend to be very temporary compared to the neck results.  He knows that this has something to do with his prior left ankle injury and gait derangement but we will continue to work on this to see if we can make more lasting benefit.    1/23/24: overall he is still extremely pleased with his treatment and has had significantly less headaches since our last treatment encounter on 1/10/2024.  However he did purchase the Chirp Wheel and reports that it felt amazing while performing the exercises but feels as if he may have \"overdone it\".  He reports that he did 2 different exercises lasting approximately 5 minutes x 2 and the next morning he had an intense left-sided headache again.  He had not had this in some time and was slightly discouraged.  Upon awakening the next morning the headache did resolve.  Again while doing some of his home exercises including a supported squat with a exercise ball on the wall he felt the same headache again.  He is curious as if there is some issues with tightness and scar " "tissue along the spine causing these issues.  It is my impression that he may have some adhesions attaching to the dura.  Many of his headaches seem as if they are dural tension like in nature/quality.    We will continue to work with soft tissue release (dry needling when appropriate) instrument assisted soft tissue mobilization and manipulation to free up some of these adhesions and restore better biomechanics.  It is advised that he really try the Chirp Wheel but only use it for about 1 minute/day.  He may have overdone it.  It was explained that we do not want to move things too fast.    1/10/24: Visit #2.  He reports that he is extremely happy with the results of his initial chiropractic treatment.  He has maintained increased range of motion in his neck and feels less stiffness and tightness on the left side of the neck and jaw.  However, he feels as if he needs some work on the right side now as the side feels a little tighter than the left with endrange movements.  Additionally, he would like to focus on the left mid back/intrascapular region which always feels \"stuck\".  He does report an incident when he was running and he slipped and fell landing on his back.  He is curious if this may have been the trigger for his pain.  He has had his massage therapist work on it at length without significant relief.  Also, he reports that he felt as if he could walk better after our last treatment but today the left hip and sacrum feel tight again.  Will try to recreate the treatment that was performed to the left hip and sacrum previously.    INITIAL INTAKE/SUBJECTIVE 12/19/23: he did see Dr. Anson De Leon on 11/30/2023 for this same complaint of chronic left lower extremity tightness and hip pain.  There is also some mild left calf atrophy but this is more likely related to a history of tib-fib fracture.  His flatfoot is slightly more pronounced on the left as well.  An MRI has been ordered of the left hip- this has " "not yet been scheduled/done. Dr. Graves believes that manipulation of the left SI joint may help with his recovery as well as potentially some dry needling to help with myofascial tightness and imbalance.      He explains that his complaints today started approximately 10 years ago.  He did have an issue with a feeling that through his left jaw off and was starting to cause TMJ and trigeminal pain.  This was corrected however \"the damage has been done\".  Since then he has dealt with a lot of tightness in the left jaw, left suboccipital, scalenes, left pec and shoulder.  This is compounded by his left-sided ankle pain which started in the 90s after a left Achilles repair and exacerbated in the early 2000's with his left ankle fracture with hardware.  He recognizes now that the left arch does fall and he tends to pronate the foot, internally rotate the left knee and feels as if he is always twisted to the right.    He does work with a  one-on-one twice a week and is trying desperately to strengthen the left side of his body.  He feels as if it is weak because he is unable to properly engage the muscles.  He has been to the chiropractor once but did not have a true manipulation instead there was some sort of mechanical device that was used.  He did not find any benefit with this.    Given recent antibiotic, 12/8/2023, due to some sinusitis issues.  No other significant health issues.    Social history: He works in large commercial real estate development.  His wife is on the board for  [employer] babies and children.  He is the father of 3 young adults.    Review of Systems   Constitutional:  Negative for appetite change, fatigue and fever.   HENT:  Negative for congestion and hearing loss.    Eyes:  Negative for visual disturbance.   Respiratory:  Negative for shortness of breath.    Cardiovascular:  Negative for chest pain.   Gastrointestinal:  Negative for nausea and vomiting.        On Uloric for " his gout and has not had an attack in a number of years.    Genitourinary:  Negative for dysuria.   Musculoskeletal:          Tib/fib Fx in 2005 playing kickball with kids.   IMAGING (Dr. Graves): CT of cervical spine from 2019 did show some spondylosis/facet arthropathy worst at C5-6 but no significant stenosis.  MRI brachial plexus from 2022 was normal, and I did personally interpret his lumbar MRI from 2021, which shows mild degenerative changes at L4-5 and L5-S1, there is a small disc bulge at L4-5 with subarticular stenosis but this is more prominent to the right side and does not fit with current symptoms.   Skin:  Negative for rash and wound.   Neurological:  Negative for dizziness and weakness.   Psychiatric/Behavioral:  Negative for confusion.    All other systems reviewed and are negative.    Hip Xrays 1/2024: BL mild OA hips    Objective   Observation : normal gait and normal posture. L ankle pronation.     Physical Exam    Examination findings (palpation & ROM): Tenderness, hypertonicity and trigger points palpated throughout the R upper trapezius, R levator scapula, left rhomboids, L middle/lower trap, L intercostals, L thoracic paraspinals,L QL,  left iliolumbar ligament and L hip flexor.  Left heel to buttock was reduced compared to the right with left-sided low back pain and quad stiffness.  No knee pain.    Segmental joint dysfunction was identified in the following areas using motion palpation and/or pain provocation assessment:  Cervical: C0-C4 (Supine)  Thoracic: T4-T7 (Prone)  Lumbopelvic: L4L5/S1, L SIJ (Side-posture and Drop)    Today's treatment:  Performed spinal manipulation to the regions of segmental dysfunction identified on examination using age-appropriate and injury specific force, and manual diversified technique.     Performed seated, supine and prone soft tissue manipulation including IASTM (instrument assisted soft tissue mobilization), MFR (Myofacial Release) and IC (ischemic  compression) to the hypertonic muscles including L upper trapezius, L levator scapula, left rhomboids, L middle/lower trap, L intercostals, L QL, L thoracic paraspinals, left iliolumbar ligament to patient tolerance.  Dry needling to the left cervical paraspinals, left upper trapezius and left iliolumbar ligament/upper gluteal region (20 in, 20 out) (Start time 10:00 am , End Time 10:20 am). 1 units.     Treatment Plan:   The patient and I discussed the risks and benefits of chiropractic care. Based on the patient's subjective complaints along with the examination findings, it is advised that a course of chiropractic treatment by initiated. Consent for care was given both written and orally by the patient. The patient tolerated today's treatment with little or no additional discomfort and was instructed to contact the office for questions or concerns.     Treatment Frequency: Will see/treat patient every 2-4 weeks as therapeutic benefit is sustained, then frequency will be reduced to as needed for symptom management or as needed for acute flare-ups/exacerbation.     Please note: Voice-to-text software was used when completing this note.  While the note was proofread, portions may include grammatical errors.  Please contact me with any questions/concerns as it relates to these types of errors.

## 2024-04-11 ENCOUNTER — ALLIED HEALTH (OUTPATIENT)
Dept: INTEGRATIVE MEDICINE | Facility: CLINIC | Age: 59
End: 2024-04-11
Payer: COMMERCIAL

## 2024-04-11 DIAGNOSIS — M79.9 POSTURAL STRAIN: ICD-10-CM

## 2024-04-11 DIAGNOSIS — M99.05 SEGMENTAL AND SOMATIC DYSFUNCTION OF PELVIC REGION: ICD-10-CM

## 2024-04-11 DIAGNOSIS — M99.01 SEGMENTAL AND SOMATIC DYSFUNCTION OF CERVICAL REGION: Primary | ICD-10-CM

## 2024-04-11 DIAGNOSIS — M99.04 SEGMENTAL AND SOMATIC DYSFUNCTION OF SACRAL REGION: ICD-10-CM

## 2024-04-11 DIAGNOSIS — M99.02 SEGMENTAL AND SOMATIC DYSFUNCTION OF THORACIC REGION: ICD-10-CM

## 2024-04-11 DIAGNOSIS — M99.00 SEGMENTAL AND SOMATIC DYSFUNCTION OF HEAD REGION: ICD-10-CM

## 2024-04-11 DIAGNOSIS — G44.86 CERVICOGENIC HEADACHE: ICD-10-CM

## 2024-04-11 DIAGNOSIS — M54.2 NECK PAIN: ICD-10-CM

## 2024-04-11 DIAGNOSIS — M79.10 MYALGIA: ICD-10-CM

## 2024-04-11 DIAGNOSIS — G89.29 CHRONIC LEFT SHOULDER PAIN: ICD-10-CM

## 2024-04-11 DIAGNOSIS — M24.552 HIP FLEXOR TENDON TIGHTNESS, LEFT: ICD-10-CM

## 2024-04-11 DIAGNOSIS — M99.03 SEGMENTAL AND SOMATIC DYSFUNCTION OF LUMBAR REGION: ICD-10-CM

## 2024-04-11 DIAGNOSIS — M25.512 CHRONIC LEFT SHOULDER PAIN: ICD-10-CM

## 2024-04-11 DIAGNOSIS — M53.3 SACRAL BACK PAIN: ICD-10-CM

## 2024-04-11 PROCEDURE — 97140 MANUAL THERAPY 1/> REGIONS: CPT | Performed by: CHIROPRACTOR

## 2024-04-11 PROCEDURE — 98942 CHIROPRACTIC MANJ 5 REGIONS: CPT | Performed by: CHIROPRACTOR

## 2024-04-11 ASSESSMENT — ENCOUNTER SYMPTOMS
DIZZINESS: 0
FEVER: 0
SHORTNESS OF BREATH: 0
NAUSEA: 0
FATIGUE: 0
WOUND: 0
VOMITING: 0
DYSURIA: 0
CONFUSION: 0
WEAKNESS: 0
APPETITE CHANGE: 0

## 2024-04-11 NOTE — PROGRESS NOTES
Subjective   Patient ID: Link Reyes is a 58 y.o. male who presents today for a follow-up treatment of his chronic left sided body pain including the left jaw, left neck, left shoulder, left hip and left sacrum.    This is visit 7 of the 2024 calendar year. [employer].     Fall risk: None. No falls in the last 6 months.     HPI -he still feels very twisted on the left side specifically in the anterior medial left knee and the psoas area.  He does feel less pain to the jaw and does feel that the last treatment we did into the scapula was beneficial.  He continues to work with his  and his massage therapist to continue to work on flexibility and balancing his posture.  Today we will work in his subscapularis muscle to see if we can release some of the scapular region and we will also check ankle mobility to see if this is contributing to his altered posture and rotation.    Last treatment 4/4/24: Kain reports that the treatment continues to make incremental improvement.  He is finding that he is able to stand more upright which alleviates a lot of tension and weight.  He is being more aware of pulling the shoulder blades down and back which offloads some of the tension he feels in the upper trapezius neck and jaw.  He did have 1 headache but it was temporary since her last treatment encounter, and this was directly related to doing wall squats.  He would like to continue with the same treatment protocol as it has been proving beneficial.  He will continue to work on stabilizing the left scapula.  This is also the same region but a lipoma was removed and there is a large scar at the lower/inferior border of the scapula.  It is unknown how much this will affect his ability to recruit muscle fibers, however we discussed that other muscles around the scar should be able to compensate.  We discussed some other exercises that he can try such as wall walks to help stabilize the shoulder blades and  strengthen the shoulders as well as active and passive hanging from a pull-up bar.  He will try these and report back.    3/29/24: he continues to be extremely happy with his chiropractic care.  He feels as if he is able to settle into a better upright posture and is able to engage his posterior lower scapular muscles better than before.  Mechanically he is feeling a little more balanced.  Today we will continue to work on the left neck and trap, left scapular region as well as the left lower back and hip.  We will also check the quadratus lumborum which would be a reasonable connection between the rib cage and shoulder to the hip and lower back.  He explains that no one has specifically ever worked on this area before.  Otherwise no changes to his medical history.  No new symptoms to report.  He does continue with his Pilates, massage and stretching.    3/4/24: he continues to be quite pleased with his treatments.  Again he would like to focus on the left neck and scapula as well as the left piriformis and glutes.  He continues to have stiffness and soreness of the left foot after his ankle surgery and injury but has been doing a little more self-care at home rolling on a double ball.    2/12/24: he continues to have incremental benefit with each successive chiropractic treatment.  The headaches are significantly less and he feels that he has a greater range of motion in the neck and cervical region overall.  Unfortunately the left sacrum and leg symptoms are also improved, but tend to be very temporary compared to the neck results.  He knows that this has something to do with his prior left ankle injury and gait derangement but we will continue to work on this to see if we can make more lasting benefit.    1/23/24: overall he is still extremely pleased with his treatment and has had significantly less headaches since our last treatment encounter on 1/10/2024.  However he did purchase the Chirp Wheel and reports  "that it felt amazing while performing the exercises but feels as if he may have \"overdone it\".  He reports that he did 2 different exercises lasting approximately 5 minutes x 2 and the next morning he had an intense left-sided headache again.  He had not had this in some time and was slightly discouraged.  Upon awakening the next morning the headache did resolve.  Again while doing some of his home exercises including a supported squat with a exercise ball on the wall he felt the same headache again.  He is curious as if there is some issues with tightness and scar tissue along the spine causing these issues.  It is my impression that he may have some adhesions attaching to the dura.  Many of his headaches seem as if they are dural tension like in nature/quality.    We will continue to work with soft tissue release (dry needling when appropriate) instrument assisted soft tissue mobilization and manipulation to free up some of these adhesions and restore better biomechanics.  It is advised that he really try the Chirp Wheel but only use it for about 1 minute/day.  He may have overdone it.  It was explained that we do not want to move things too fast.    1/10/24: Visit #2.  He reports that he is extremely happy with the results of his initial chiropractic treatment.  He has maintained increased range of motion in his neck and feels less stiffness and tightness on the left side of the neck and jaw.  However, he feels as if he needs some work on the right side now as the side feels a little tighter than the left with endrange movements.  Additionally, he would like to focus on the left mid back/intrascapular region which always feels \"stuck\".  He does report an incident when he was running and he slipped and fell landing on his back.  He is curious if this may have been the trigger for his pain.  He has had his massage therapist work on it at length without significant relief.  Also, he reports that he felt as if he " "could walk better after our last treatment but today the left hip and sacrum feel tight again.  Will try to recreate the treatment that was performed to the left hip and sacrum previously.    INITIAL INTAKE/SUBJECTIVE 12/19/23: he did see Dr. Anson De Leon on 11/30/2023 for this same complaint of chronic left lower extremity tightness and hip pain.  There is also some mild left calf atrophy but this is more likely related to a history of tib-fib fracture.  His flatfoot is slightly more pronounced on the left as well.  An MRI has been ordered of the left hip- this has not yet been scheduled/done. Dr. Graves believes that manipulation of the left SI joint may help with his recovery as well as potentially some dry needling to help with myofascial tightness and imbalance.      He explains that his complaints today started approximately 10 years ago.  He did have an issue with a feeling that through his left jaw off and was starting to cause TMJ and trigeminal pain.  This was corrected however \"the damage has been done\".  Since then he has dealt with a lot of tightness in the left jaw, left suboccipital, scalenes, left pec and shoulder.  This is compounded by his left-sided ankle pain which started in the 90s after a left Achilles repair and exacerbated in the early 2000's with his left ankle fracture with hardware.  He recognizes now that the left arch does fall and he tends to pronate the foot, internally rotate the left knee and feels as if he is always twisted to the right.    He does work with a  one-on-one twice a week and is trying desperately to strengthen the left side of his body.  He feels as if it is weak because he is unable to properly engage the muscles.  He has been to the chiropractor once but did not have a true manipulation instead there was some sort of mechanical device that was used.  He did not find any benefit with this.    Given recent antibiotic, 12/8/2023, due to some " sinusitis issues.  No other significant health issues.    Social history: He works in large commercial real estate development.  His wife is on the board for  [employer] babies and children.  He is the father of 3 young adults.    Review of Systems   Constitutional:  Negative for appetite change, fatigue and fever.   HENT:  Negative for congestion and hearing loss.    Eyes:  Negative for visual disturbance.   Respiratory:  Negative for shortness of breath.    Cardiovascular:  Negative for chest pain.   Gastrointestinal:  Negative for nausea and vomiting.        On Uloric for his gout and has not had an attack in a number of years.    Genitourinary:  Negative for dysuria.   Musculoskeletal:          Tib/fib Fx in 2005 playing kickball with kids.   IMAGING (Dr. Graves): CT of cervical spine from 2019 did show some spondylosis/facet arthropathy worst at C5-6 but no significant stenosis.  MRI brachial plexus from 2022 was normal, and I did personally interpret his lumbar MRI from 2021, which shows mild degenerative changes at L4-5 and L5-S1, there is a small disc bulge at L4-5 with subarticular stenosis but this is more prominent to the right side and does not fit with current symptoms.   Skin:  Negative for rash and wound.   Neurological:  Negative for dizziness and weakness.   Psychiatric/Behavioral:  Negative for confusion.    All other systems reviewed and are negative.    Hip Xrays 1/2024: BL mild OA hips    Objective   Observation : normal gait and normal posture. L ankle pronation.     Physical Exam    Examination findings (palpation & ROM): Tenderness, hypertonicity and trigger points palpated throughout the R upper trapezius, R levator scapula, left rhomboids, L middle/lower trap, L intercostals, L thoracic paraspinals,L QL,  left iliolumbar ligament and L hip flexor.  Left heel to buttock was reduced compared to the right with left-sided low back pain and quad stiffness.  Reported left medial knee pain.  Also  trigger points and tenderness with subscapularis palpation in the axilla.    Segmental joint dysfunction was identified in the following areas using motion palpation and/or pain provocation assessment:  Cervical: C0-C4 (Supine)  Thoracic: T4-T7 (Prone)  Lumbopelvic: L4L5/S1, L SIJ (Side-posture and Drop)    Today's treatment:  Performed spinal manipulation to the regions of segmental dysfunction identified on examination using age-appropriate and injury specific force, and manual diversified technique.     Performed seated, supine and prone soft tissue manipulation including IASTM (instrument assisted soft tissue mobilization), MFR (Myofacial Release) and IC (ischemic compression) to the hypertonic muscles including L upper trapezius, L levator scapula, left rhomboids, L middle/lower trap, L intercostals, L QL, L thoracic paraspinals, left iliolumbar ligament to patient tolerance.  Pin and stretch technique to the left subscapularis.  Dry needling to the left cervical paraspinals, left upper trapezius and left iliolumbar ligament/upper gluteal region (20 in, 20 out).  Ankle and calcaneus mobilization and traction in prone with some traction of the hip.  (Start time 12:40 pm , End Time 1:00 pm). 1 units.     Treatment Plan:   The patient and I discussed the risks and benefits of chiropractic care. Based on the patient's subjective complaints along with the examination findings, it is advised that a course of chiropractic treatment by initiated. Consent for care was given both written and orally by the patient. The patient tolerated today's treatment with little or no additional discomfort and was instructed to contact the office for questions or concerns.     Treatment Frequency: Will see/treat patient every 2-4 weeks as therapeutic benefit is sustained, then frequency will be reduced to as needed for symptom management or as needed for acute flare-ups/exacerbation.     Please note: Voice-to-text software was used  when completing this note.  While the note was proofread, portions may include grammatical errors.  Please contact me with any questions/concerns as it relates to these types of errors.

## 2024-04-28 ASSESSMENT — ENCOUNTER SYMPTOMS
SHORTNESS OF BREATH: 0
DIZZINESS: 0
APPETITE CHANGE: 0
FEVER: 0
WOUND: 0
WEAKNESS: 0
CONFUSION: 0
NAUSEA: 0
DYSURIA: 0
FATIGUE: 0
VOMITING: 0

## 2024-04-28 NOTE — PROGRESS NOTES
Subjective   Patient ID: Link Reyse is a 58 y.o. male who presents today for a follow-up treatment of his chronic left sided body pain including the left jaw, left neck, left shoulder, left hip and left sacrum.    This is visit 8 of the 2024 calendar year. [employer].     Fall risk: None. No falls in the last 6 months.     HPI -he continues to notice measurable improvements with respect to his posture and pain.  He has minimal left neck and jaw pain at this time and is even feeling little more stiffness on the right.  He also feels as if the left scapula is starting to loosen up and he has more freedom of movement to be in a better upright posture when standing and walking.  He has adjusted his rehab protocol to include more small specialized movement patterns to address the scapular stabilizers, the core and the glutes.    Last treatment 4/11/24: he still feels very twisted on the left side specifically in the anterior medial left knee and the psoas area.  He does feel less pain to the jaw and does feel that the last treatment we did into the scapula was beneficial.  He continues to work with his  and his massage therapist to continue to work on flexibility and balancing his posture.  Today we will work in his subscapularis muscle to see if we can release some of the scapular region and we will also check ankle mobility to see if this is contributing to his altered posture and rotation.    4/4/24: Kain reports that the treatment continues to make incremental improvement.  He is finding that he is able to stand more upright which alleviates a lot of tension and weight.  He is being more aware of pulling the shoulder blades down and back which offloads some of the tension he feels in the upper trapezius neck and jaw.  He did have 1 headache but it was temporary since her last treatment encounter, and this was directly related to doing wall squats.  He would like to continue with the same  treatment protocol as it has been proving beneficial.  He will continue to work on stabilizing the left scapula.  This is also the same region but a lipoma was removed and there is a large scar at the lower/inferior border of the scapula.  It is unknown how much this will affect his ability to recruit muscle fibers, however we discussed that other muscles around the scar should be able to compensate.  We discussed some other exercises that he can try such as wall walks to help stabilize the shoulder blades and strengthen the shoulders as well as active and passive hanging from a pull-up bar.  He will try these and report back.    3/29/24: he continues to be extremely happy with his chiropractic care.  He feels as if he is able to settle into a better upright posture and is able to engage his posterior lower scapular muscles better than before.  Mechanically he is feeling a little more balanced.  Today we will continue to work on the left neck and trap, left scapular region as well as the left lower back and hip.  We will also check the quadratus lumborum which would be a reasonable connection between the rib cage and shoulder to the hip and lower back.  He explains that no one has specifically ever worked on this area before.  Otherwise no changes to his medical history.  No new symptoms to report.  He does continue with his Pilates, massage and stretching.    3/4/24: he continues to be quite pleased with his treatments.  Again he would like to focus on the left neck and scapula as well as the left piriformis and glutes.  He continues to have stiffness and soreness of the left foot after his ankle surgery and injury but has been doing a little more self-care at home rolling on a double ball.    2/12/24: he continues to have incremental benefit with each successive chiropractic treatment.  The headaches are significantly less and he feels that he has a greater range of motion in the neck and cervical region overall.   "Unfortunately the left sacrum and leg symptoms are also improved, but tend to be very temporary compared to the neck results.  He knows that this has something to do with his prior left ankle injury and gait derangement but we will continue to work on this to see if we can make more lasting benefit.    1/23/24: overall he is still extremely pleased with his treatment and has had significantly less headaches since our last treatment encounter on 1/10/2024.  However he did purchase the Chirp Wheel and reports that it felt amazing while performing the exercises but feels as if he may have \"overdone it\".  He reports that he did 2 different exercises lasting approximately 5 minutes x 2 and the next morning he had an intense left-sided headache again.  He had not had this in some time and was slightly discouraged.  Upon awakening the next morning the headache did resolve.  Again while doing some of his home exercises including a supported squat with a exercise ball on the wall he felt the same headache again.  He is curious as if there is some issues with tightness and scar tissue along the spine causing these issues.  It is my impression that he may have some adhesions attaching to the dura.  Many of his headaches seem as if they are dural tension like in nature/quality.    We will continue to work with soft tissue release (dry needling when appropriate) instrument assisted soft tissue mobilization and manipulation to free up some of these adhesions and restore better biomechanics.  It is advised that he really try the Chirp Wheel but only use it for about 1 minute/day.  He may have overdone it.  It was explained that we do not want to move things too fast.    1/10/24: Visit #2.  He reports that he is extremely happy with the results of his initial chiropractic treatment.  He has maintained increased range of motion in his neck and feels less stiffness and tightness on the left side of the neck and jaw.  However, he " "feels as if he needs some work on the right side now as the side feels a little tighter than the left with endrange movements.  Additionally, he would like to focus on the left mid back/intrascapular region which always feels \"stuck\".  He does report an incident when he was running and he slipped and fell landing on his back.  He is curious if this may have been the trigger for his pain.  He has had his massage therapist work on it at length without significant relief.  Also, he reports that he felt as if he could walk better after our last treatment but today the left hip and sacrum feel tight again.  Will try to recreate the treatment that was performed to the left hip and sacrum previously.    INITIAL INTAKE/SUBJECTIVE 12/19/23: he did see Dr. Anson De Leon on 11/30/2023 for this same complaint of chronic left lower extremity tightness and hip pain.  There is also some mild left calf atrophy but this is more likely related to a history of tib-fib fracture.  His flatfoot is slightly more pronounced on the left as well.  An MRI has been ordered of the left hip- this has not yet been scheduled/done. Dr. Graves believes that manipulation of the left SI joint may help with his recovery as well as potentially some dry needling to help with myofascial tightness and imbalance.      He explains that his complaints today started approximately 10 years ago.  He did have an issue with a feeling that through his left jaw off and was starting to cause TMJ and trigeminal pain.  This was corrected however \"the damage has been done\".  Since then he has dealt with a lot of tightness in the left jaw, left suboccipital, scalenes, left pec and shoulder.  This is compounded by his left-sided ankle pain which started in the 90s after a left Achilles repair and exacerbated in the early 2000's with his left ankle fracture with hardware.  He recognizes now that the left arch does fall and he tends to pronate the foot, internally " rotate the left knee and feels as if he is always twisted to the right.    He does work with a  one-on-one twice a week and is trying desperately to strengthen the left side of his body.  He feels as if it is weak because he is unable to properly engage the muscles.  He has been to the chiropractor once but did not have a true manipulation instead there was some sort of mechanical device that was used.  He did not find any benefit with this.    Given recent antibiotic, 12/8/2023, due to some sinusitis issues.  No other significant health issues.    Social history: He works in large commercial real estate development.  His wife is on the board for  [employer] and children.  He is the father of 3 young adults.    Review of Systems   Constitutional:  Negative for appetite change, fatigue and fever.   HENT:  Negative for congestion and hearing loss.    Eyes:  Negative for visual disturbance.   Respiratory:  Negative for shortness of breath.    Cardiovascular:  Negative for chest pain.   Gastrointestinal:  Negative for nausea and vomiting.        On Uloric for his gout and has not had an attack in a number of years.    Genitourinary:  Negative for dysuria.   Musculoskeletal:          Tib/fib Fx in 2005 playing kickball with kids.   IMAGING (Dr. Graves): CT of cervical spine from 2019 did show some spondylosis/facet arthropathy worst at C5-6 but no significant stenosis.  MRI brachial plexus from 2022 was normal, and I did personally interpret his lumbar MRI from 2021, which shows mild degenerative changes at L4-5 and L5-S1, there is a small disc bulge at L4-5 with subarticular stenosis but this is more prominent to the right side and does not fit with current symptoms.   Skin:  Negative for rash and wound.   Neurological:  Negative for dizziness and weakness.   Psychiatric/Behavioral:  Negative for confusion.    All other systems reviewed and are negative.    Hip Xrays 1/2024: BL mild OA  hips    Objective   Observation : normal gait and normal posture. L ankle pronation.     Physical Exam    Examination findings (palpation & ROM): Tenderness, hypertonicity and trigger points palpated throughout the R upper trapezius, R levator scapula, left rhomboids, L middle/lower trap, L intercostals, L thoracic paraspinals, L QL,  left iliolumbar ligament and L hip flexor.  Left heel to buttock was reduced compared to the right with left-sided low back pain and quad stiffness.  Reported left medial knee pain.  Also trigger points and tenderness with subscapularis palpation in the axilla.    Segmental joint dysfunction was identified in the following areas using motion palpation and/or pain provocation assessment:  Cervical: C0-C4 (Supine)  Thoracic: T4-T7 (Prone)  Lumbopelvic: L4L5/S1, L SIJ (Side-posture and Drop)    Today's treatment:  Performed spinal manipulation to the regions of segmental dysfunction identified on examination using age-appropriate and injury specific force, and manual diversified technique.     Performed seated, supine and prone soft tissue manipulation including seated, supine and prone IASTM (instrument assisted soft tissue mobilization), MFR (Myofacial Release) and IC (ischemic compression) to the hypertonic muscles including L upper trapezius, L levator scapula, left rhomboids, L middle/lower trap, L intercostals, L QL, L thoracic paraspinals, left iliolumbar ligament to patient tolerance.  Pin and stretch technique to the left subscapularis.  Dry needling to the left cervical paraspinals, left upper trapezius, L rhomboids, and left iliolumbar ligament/upper gluteal region (20 in, 20 out).  Ankle and calcaneus mobilization and traction in prone with some traction of the hip.  (Start time 9:20 am , End Time 9:35 am). 1 units.     Treatment Plan:   The patient and I discussed the risks and benefits of chiropractic care. Based on the patient's subjective complaints along with the  examination findings, it is advised that a course of chiropractic treatment by initiated. Consent for care was given both written and orally by the patient. The patient tolerated today's treatment with little or no additional discomfort and was instructed to contact the office for questions or concerns.     Treatment Frequency: Will see/treat patient every 2-4 weeks as therapeutic benefit is sustained, then frequency will be reduced to as needed for symptom management or as needed for acute flare-ups/exacerbation.     Please note: Voice-to-text software was used when completing this note.  While the note was proofread, portions may include grammatical errors.  Please contact me with any questions/concerns as it relates to these types of errors.

## 2024-04-29 ENCOUNTER — ALLIED HEALTH (OUTPATIENT)
Dept: INTEGRATIVE MEDICINE | Facility: CLINIC | Age: 59
End: 2024-04-29
Payer: COMMERCIAL

## 2024-04-29 DIAGNOSIS — M79.10 MYALGIA: ICD-10-CM

## 2024-04-29 DIAGNOSIS — M99.01 SEGMENTAL AND SOMATIC DYSFUNCTION OF CERVICAL REGION: Primary | ICD-10-CM

## 2024-04-29 DIAGNOSIS — M24.552 HIP FLEXOR TENDON TIGHTNESS, LEFT: ICD-10-CM

## 2024-04-29 DIAGNOSIS — M99.00 SEGMENTAL AND SOMATIC DYSFUNCTION OF HEAD REGION: ICD-10-CM

## 2024-04-29 DIAGNOSIS — M79.9 POSTURAL STRAIN: ICD-10-CM

## 2024-04-29 DIAGNOSIS — M99.05 SEGMENTAL AND SOMATIC DYSFUNCTION OF PELVIC REGION: ICD-10-CM

## 2024-04-29 DIAGNOSIS — G44.86 CERVICOGENIC HEADACHE: ICD-10-CM

## 2024-04-29 DIAGNOSIS — M99.02 SEGMENTAL AND SOMATIC DYSFUNCTION OF THORACIC REGION: ICD-10-CM

## 2024-04-29 DIAGNOSIS — M54.2 NECK PAIN: ICD-10-CM

## 2024-04-29 DIAGNOSIS — M25.552 PAIN OF LEFT HIP: ICD-10-CM

## 2024-04-29 DIAGNOSIS — M53.3 SACRAL BACK PAIN: ICD-10-CM

## 2024-04-29 DIAGNOSIS — M25.512 CHRONIC LEFT SHOULDER PAIN: ICD-10-CM

## 2024-04-29 DIAGNOSIS — M99.04 SEGMENTAL AND SOMATIC DYSFUNCTION OF SACRAL REGION: ICD-10-CM

## 2024-04-29 DIAGNOSIS — G89.29 CHRONIC LEFT SHOULDER PAIN: ICD-10-CM

## 2024-04-29 DIAGNOSIS — M99.03 SEGMENTAL AND SOMATIC DYSFUNCTION OF LUMBAR REGION: ICD-10-CM

## 2024-04-29 PROCEDURE — 98942 CHIROPRACTIC MANJ 5 REGIONS: CPT | Performed by: CHIROPRACTOR

## 2024-04-29 PROCEDURE — 97140 MANUAL THERAPY 1/> REGIONS: CPT | Performed by: CHIROPRACTOR

## 2024-05-07 DIAGNOSIS — R07.89 CHEST PRESSURE: Primary | ICD-10-CM

## 2024-05-07 DIAGNOSIS — R10.84 GENERALIZED ABDOMINAL PAIN: ICD-10-CM

## 2024-05-08 ENCOUNTER — OFFICE VISIT (OUTPATIENT)
Dept: PRIMARY CARE | Facility: CLINIC | Age: 59
End: 2024-05-08
Payer: COMMERCIAL

## 2024-05-08 ENCOUNTER — APPOINTMENT (OUTPATIENT)
Dept: INTEGRATIVE MEDICINE | Facility: CLINIC | Age: 59
End: 2024-05-08
Payer: COMMERCIAL

## 2024-05-08 VITALS — SYSTOLIC BLOOD PRESSURE: 110 MMHG | DIASTOLIC BLOOD PRESSURE: 68 MMHG | OXYGEN SATURATION: 98 % | HEART RATE: 81 BPM

## 2024-05-08 DIAGNOSIS — T78.1XXA ADVERSE FOOD REACTION, INITIAL ENCOUNTER: Primary | ICD-10-CM

## 2024-05-08 PROCEDURE — 99213 OFFICE O/P EST LOW 20 MIN: CPT | Performed by: INTERNAL MEDICINE

## 2024-05-08 NOTE — PROGRESS NOTES
Subjective   Patient ID: Link Reyes is a 58 y.o. male who presents for No chief complaint on file..    Patient is here for follow-up.  He had an episode over the weekend where he became extremely bloated felt dizzy and lightheaded.  He went to the emergency room at [facility] he was evaluated there with a cardiac workup was unremarkable he was discharged home and had felt well over the weekend but this morning he woke up with recurrence of his abdominal bloating.  This time he did not have any pain but had an excessive amount of gas.  The gas was not described as foul-smelling he had no associated nausea or vomiting has had no chest pain or shortness of breath.  He is getting ready to have a cardiac workup due to his history of chronic hyperlipidemia.  Is also been referred to cardiology to start  PCSK9 inhibitor.  He is also been having a lot of allergy type symptoms with itchy watery eyes and a little bit of  pressure in his head as well.  He has no purulent drainage no fever.         Review of Systems    Objective   /68   Pulse 81   SpO2 98%     Physical Exam  HENT:      Right Ear: Tympanic membrane normal.      Left Ear: Tympanic membrane normal.      Nose: Nose normal.      Mouth/Throat:      Mouth: Mucous membranes are moist.      Pharynx: Oropharynx is clear.   Cardiovascular:      Rate and Rhythm: Normal rate and regular rhythm.      Pulses: Normal pulses.      Heart sounds: Normal heart sounds.   Pulmonary:      Breath sounds: Normal breath sounds.   Abdominal:      General: Abdomen is flat. Bowel sounds are normal. There is no distension.      Palpations: Abdomen is soft. There is no mass.      Tenderness: There is no abdominal tenderness.   Musculoskeletal:      Right lower leg: No edema.      Left lower leg: No edema.         Assessment/Plan     Possible postviral gastroenteritis symptoms.  Will get a might have some probiotics.  And also have him take some  over-the-counter antihistamines for the upper respiratory symptoms.  He will call if symptoms do not improve.  Patient will be scheduled for a cardiac evaluation in the near future.

## 2024-05-09 ENCOUNTER — HOSPITAL ENCOUNTER (OUTPATIENT)
Dept: RADIOLOGY | Facility: HOSPITAL | Age: 59
Discharge: HOME | End: 2024-05-09
Payer: COMMERCIAL

## 2024-05-09 ENCOUNTER — OFFICE VISIT (OUTPATIENT)
Dept: PRIMARY CARE | Facility: CLINIC | Age: 59
End: 2024-05-09
Payer: COMMERCIAL

## 2024-05-09 ENCOUNTER — LAB (OUTPATIENT)
Dept: LAB | Facility: LAB | Age: 59
End: 2024-05-09
Payer: COMMERCIAL

## 2024-05-09 VITALS — SYSTOLIC BLOOD PRESSURE: 120 MMHG | HEART RATE: 74 BPM | OXYGEN SATURATION: 96 % | DIASTOLIC BLOOD PRESSURE: 76 MMHG

## 2024-05-09 DIAGNOSIS — R10.84 GENERALIZED ABDOMINAL PAIN: ICD-10-CM

## 2024-05-09 DIAGNOSIS — R10.84 GENERALIZED ABDOMINAL PAIN: Primary | ICD-10-CM

## 2024-05-09 LAB
ALBUMIN SERPL BCP-MCNC: 5.4 G/DL (ref 3.4–5)
ALP SERPL-CCNC: 54 U/L (ref 33–120)
ALT SERPL W P-5'-P-CCNC: 29 U/L (ref 10–52)
AMYLASE SERPL-CCNC: 34 U/L (ref 29–103)
ANION GAP SERPL CALC-SCNC: 14 MMOL/L (ref 10–20)
AST SERPL W P-5'-P-CCNC: 24 U/L (ref 9–39)
BASOPHILS # BLD AUTO: 0.01 X10*3/UL (ref 0–0.1)
BASOPHILS NFR BLD AUTO: 0.2 %
BILIRUB SERPL-MCNC: 0.7 MG/DL (ref 0–1.2)
BUN SERPL-MCNC: 13 MG/DL (ref 6–23)
CALCIUM SERPL-MCNC: 9.6 MG/DL (ref 8.6–10.3)
CHLORIDE SERPL-SCNC: 100 MMOL/L (ref 98–107)
CHOLEST SERPL-MCNC: 174 MG/DL (ref 0–199)
CHOLESTEROL/HDL RATIO: 4.5
CO2 SERPL-SCNC: 29 MMOL/L (ref 21–32)
CREAT SERPL-MCNC: 0.93 MG/DL (ref 0.5–1.3)
EGFRCR SERPLBLD CKD-EPI 2021: >90 ML/MIN/1.73M*2
EOSINOPHIL # BLD AUTO: 0.04 X10*3/UL (ref 0–0.7)
EOSINOPHIL NFR BLD AUTO: 0.8 %
ERYTHROCYTE [DISTWIDTH] IN BLOOD BY AUTOMATED COUNT: 12.5 % (ref 11.5–14.5)
GLUCOSE SERPL-MCNC: 96 MG/DL (ref 74–99)
HCT VFR BLD AUTO: 49.2 % (ref 41–52)
HDLC SERPL-MCNC: 38.6 MG/DL
HGB BLD-MCNC: 16.9 G/DL (ref 13.5–17.5)
IMM GRANULOCYTES # BLD AUTO: 0.01 X10*3/UL (ref 0–0.7)
IMM GRANULOCYTES NFR BLD AUTO: 0.2 % (ref 0–0.9)
LDLC SERPL CALC-MCNC: 105 MG/DL
LYMPHOCYTES # BLD AUTO: 1.31 X10*3/UL (ref 1.2–4.8)
LYMPHOCYTES NFR BLD AUTO: 24.9 %
MCH RBC QN AUTO: 28.8 PG (ref 26–34)
MCHC RBC AUTO-ENTMCNC: 34.3 G/DL (ref 32–36)
MCV RBC AUTO: 84 FL (ref 80–100)
MONOCYTES # BLD AUTO: 0.46 X10*3/UL (ref 0.1–1)
MONOCYTES NFR BLD AUTO: 8.7 %
NEUTROPHILS # BLD AUTO: 3.43 X10*3/UL (ref 1.2–7.7)
NEUTROPHILS NFR BLD AUTO: 65.2 %
NON HDL CHOLESTEROL: 135 MG/DL (ref 0–149)
NRBC BLD-RTO: 0 /100 WBCS (ref 0–0)
PLATELET # BLD AUTO: 204 X10*3/UL (ref 150–450)
POTASSIUM SERPL-SCNC: 4.4 MMOL/L (ref 3.5–5.3)
PROT SERPL-MCNC: 8.1 G/DL (ref 6.4–8.2)
RBC # BLD AUTO: 5.87 X10*6/UL (ref 4.5–5.9)
SODIUM SERPL-SCNC: 139 MMOL/L (ref 136–145)
TRIGL SERPL-MCNC: 154 MG/DL (ref 0–149)
VLDL: 31 MG/DL (ref 0–40)
WBC # BLD AUTO: 5.3 X10*3/UL (ref 4.4–11.3)

## 2024-05-09 PROCEDURE — 85025 COMPLETE CBC W/AUTO DIFF WBC: CPT

## 2024-05-09 PROCEDURE — 80061 LIPID PANEL: CPT

## 2024-05-09 PROCEDURE — 74018 RADEX ABDOMEN 1 VIEW: CPT

## 2024-05-09 PROCEDURE — 82150 ASSAY OF AMYLASE: CPT

## 2024-05-09 PROCEDURE — 74018 RADEX ABDOMEN 1 VIEW: CPT | Performed by: RADIOLOGY

## 2024-05-09 PROCEDURE — 36415 COLL VENOUS BLD VENIPUNCTURE: CPT

## 2024-05-09 PROCEDURE — 80053 COMPREHEN METABOLIC PANEL: CPT

## 2024-05-09 PROCEDURE — 99214 OFFICE O/P EST MOD 30 MIN: CPT | Performed by: INTERNAL MEDICINE

## 2024-05-09 NOTE — PROGRESS NOTES
Patient is here for follow-up he felt a little bit better after taking probiotics but this morning woke up with increased abdominal bloating discomfort excessive gas.  He also feels somewhat constipated.  He denies any nausea or vomiting he has no fever or chills he does have some upper respiratory symptoms which are unchanged.    His abdomen is soft and nontender there is no hepatosplenomegaly the bowel sounds are normal active    Plain films of the abdomen obtained which showed nonobstructive gas pattern and a fair amount of stool in the sigmoid colon.  Labs were all within normal limits.    Most likely constipation causing symptoms of bloating and distention.  Get him into a bowel cleansing regimen using MiraLAX 4 caps in 1 quart of water and he will call if symptoms do not improve.

## 2024-05-20 ENCOUNTER — HOSPITAL ENCOUNTER (OUTPATIENT)
Dept: CARDIOLOGY | Facility: HOSPITAL | Age: 59
Discharge: HOME | End: 2024-05-20
Payer: COMMERCIAL

## 2024-05-20 DIAGNOSIS — R07.89 CHEST PRESSURE: ICD-10-CM

## 2024-05-20 PROCEDURE — 93350 STRESS TTE ONLY: CPT | Performed by: INTERNAL MEDICINE

## 2024-05-20 PROCEDURE — 93016 CV STRESS TEST SUPVJ ONLY: CPT | Performed by: INTERNAL MEDICINE

## 2024-05-20 PROCEDURE — 2500000004 HC RX 250 GENERAL PHARMACY W/ HCPCS (ALT 636 FOR OP/ED): Performed by: INTERNAL MEDICINE

## 2024-05-20 PROCEDURE — 93018 CV STRESS TEST I&R ONLY: CPT | Performed by: INTERNAL MEDICINE

## 2024-05-20 PROCEDURE — 93017 CV STRESS TEST TRACING ONLY: CPT

## 2024-05-20 RX ADMIN — PERFLUTREN 2 ML OF DILUTION: 6.52 INJECTION, SUSPENSION INTRAVENOUS at 08:50

## 2024-05-22 ENCOUNTER — ALLIED HEALTH (OUTPATIENT)
Dept: INTEGRATIVE MEDICINE | Facility: CLINIC | Age: 59
End: 2024-05-22
Payer: COMMERCIAL

## 2024-05-22 DIAGNOSIS — M99.01 SEGMENTAL AND SOMATIC DYSFUNCTION OF CERVICAL REGION: Primary | ICD-10-CM

## 2024-05-22 DIAGNOSIS — M54.2 NECK PAIN: ICD-10-CM

## 2024-05-22 DIAGNOSIS — M1A.9XX0 CHRONIC GOUT WITHOUT TOPHUS, UNSPECIFIED CAUSE, UNSPECIFIED SITE: Primary | ICD-10-CM

## 2024-05-22 DIAGNOSIS — M24.552 HIP FLEXOR TENDON TIGHTNESS, LEFT: ICD-10-CM

## 2024-05-22 DIAGNOSIS — M53.3 SACRAL BACK PAIN: ICD-10-CM

## 2024-05-22 DIAGNOSIS — M99.03 SEGMENTAL AND SOMATIC DYSFUNCTION OF LUMBAR REGION: ICD-10-CM

## 2024-05-22 DIAGNOSIS — G89.29 CHRONIC LEFT SHOULDER PAIN: ICD-10-CM

## 2024-05-22 DIAGNOSIS — M99.05 SEGMENTAL AND SOMATIC DYSFUNCTION OF PELVIC REGION: ICD-10-CM

## 2024-05-22 DIAGNOSIS — M99.02 SEGMENTAL AND SOMATIC DYSFUNCTION OF THORACIC REGION: ICD-10-CM

## 2024-05-22 DIAGNOSIS — M99.04 SEGMENTAL AND SOMATIC DYSFUNCTION OF SACRAL REGION: ICD-10-CM

## 2024-05-22 DIAGNOSIS — G44.86 CERVICOGENIC HEADACHE: ICD-10-CM

## 2024-05-22 DIAGNOSIS — M25.512 CHRONIC LEFT SHOULDER PAIN: ICD-10-CM

## 2024-05-22 DIAGNOSIS — M79.9 POSTURAL STRAIN: ICD-10-CM

## 2024-05-22 DIAGNOSIS — M25.552 PAIN OF LEFT HIP: ICD-10-CM

## 2024-05-22 DIAGNOSIS — M99.00 SEGMENTAL AND SOMATIC DYSFUNCTION OF HEAD REGION: ICD-10-CM

## 2024-05-22 DIAGNOSIS — M79.10 MYALGIA: ICD-10-CM

## 2024-05-22 DIAGNOSIS — E29.1 HYPOGONADISM MALE: ICD-10-CM

## 2024-05-22 PROCEDURE — 98942 CHIROPRACTIC MANJ 5 REGIONS: CPT | Performed by: CHIROPRACTOR

## 2024-05-22 PROCEDURE — 97140 MANUAL THERAPY 1/> REGIONS: CPT | Performed by: CHIROPRACTOR

## 2024-05-22 ASSESSMENT — ENCOUNTER SYMPTOMS
WEAKNESS: 0
NAUSEA: 0
SHORTNESS OF BREATH: 0
CONFUSION: 0
DIZZINESS: 0
DYSURIA: 0
APPETITE CHANGE: 0
WOUND: 0
FATIGUE: 0
FEVER: 0
VOMITING: 0

## 2024-05-22 NOTE — PROGRESS NOTES
Subjective   Patient ID: Link Reyes is a 58 y.o. male who presents today for a follow-up treatment of his chronic left sided body pain including the left jaw, left neck, left shoulder, left hip and left sacrum.    This is visit 9 of the 2024 calendar year. [employer].     Fall risk: None. No falls in the last 6 months.     HPI -overall he is extremely pleased with the aggressive nature of carb a little more help with cyclic rehabbing from postural reeducation of the left scapula and lower trapezius as well as the left psoas and hip.  He will be referred to a friend and colleague of mine, Dr. Matt Sesay.  In addition to this he will continue to work on releasing the left scapular stabilizing muscles including the subscapularis upper trapezius and middle trap and levator scapula muscles.  He also continues to have tightness of the left sacrum and hip which we will continue to work on mobility.     Last treatment 4/29/24: he continues to notice measurable improvements with respect to his posture and pain.  He has minimal left neck and jaw pain at this time and is even feeling little more stiffness on the right.  He also feels as if the left scapula is starting to loosen up and he has more freedom of movement to be in a better upright posture when standing and walking.  He has adjusted his rehab protocol to include more small specialized movement patterns to address the scapular stabilizers, the core and the glutes.    4/11/24: he still feels very twisted on the left side specifically in the anterior medial left knee and the psoas area.  He does feel less pain to the jaw and does feel that the last treatment we did into the scapula was beneficial.  He continues to work with his  and his massage therapist to continue to work on flexibility and balancing his posture.  Today we will work in his subscapularis muscle to see if we can release some of the scapular region and we will also check ankle  mobility to see if this is contributing to his altered posture and rotation.    4/4/24: Kain reports that the treatment continues to make incremental improvement.  He is finding that he is able to stand more upright which alleviates a lot of tension and weight.  He is being more aware of pulling the shoulder blades down and back which offloads some of the tension he feels in the upper trapezius neck and jaw.  He did have 1 headache but it was temporary since her last treatment encounter, and this was directly related to doing wall squats.  He would like to continue with the same treatment protocol as it has been proving beneficial.  He will continue to work on stabilizing the left scapula.  This is also the same region but a lipoma was removed and there is a large scar at the lower/inferior border of the scapula.  It is unknown how much this will affect his ability to recruit muscle fibers, however we discussed that other muscles around the scar should be able to compensate.  We discussed some other exercises that he can try such as wall walks to help stabilize the shoulder blades and strengthen the shoulders as well as active and passive hanging from a pull-up bar.  He will try these and report back.    3/29/24: he continues to be extremely happy with his chiropractic care.  He feels as if he is able to settle into a better upright posture and is able to engage his posterior lower scapular muscles better than before.  Mechanically he is feeling a little more balanced.  Today we will continue to work on the left neck and trap, left scapular region as well as the left lower back and hip.  We will also check the quadratus lumborum which would be a reasonable connection between the rib cage and shoulder to the hip and lower back.  He explains that no one has specifically ever worked on this area before.  Otherwise no changes to his medical history.  No new symptoms to report.  He does continue with his Pilates,  "massage and stretching.    3/4/24: he continues to be quite pleased with his treatments.  Again he would like to focus on the left neck and scapula as well as the left piriformis and glutes.  He continues to have stiffness and soreness of the left foot after his ankle surgery and injury but has been doing a little more self-care at home rolling on a double ball.    2/12/24: he continues to have incremental benefit with each successive chiropractic treatment.  The headaches are significantly less and he feels that he has a greater range of motion in the neck and cervical region overall.  Unfortunately the left sacrum and leg symptoms are also improved, but tend to be very temporary compared to the neck results.  He knows that this has something to do with his prior left ankle injury and gait derangement but we will continue to work on this to see if we can make more lasting benefit.    1/23/24: overall he is still extremely pleased with his treatment and has had significantly less headaches since our last treatment encounter on 1/10/2024.  However he did purchase the Hithru Wheel and reports that it felt amazing while performing the exercises but feels as if he may have \"overdone it\".  He reports that he did 2 different exercises lasting approximately 5 minutes x 2 and the next morning he had an intense left-sided headache again.  He had not had this in some time and was slightly discouraged.  Upon awakening the next morning the headache did resolve.  Again while doing some of his home exercises including a supported squat with a exercise ball on the wall he felt the same headache again.  He is curious as if there is some issues with tightness and scar tissue along the spine causing these issues.  It is my impression that he may have some adhesions attaching to the dura.  Many of his headaches seem as if they are dural tension like in nature/quality.    We will continue to work with soft tissue release (dry needling " "when appropriate) instrument assisted soft tissue mobilization and manipulation to free up some of these adhesions and restore better biomechanics.  It is advised that he really try the Chirp Wheel but only use it for about 1 minute/day.  He may have overdone it.  It was explained that we do not want to move things too fast.    1/10/24: Visit #2.  He reports that he is extremely happy with the results of his initial chiropractic treatment.  He has maintained increased range of motion in his neck and feels less stiffness and tightness on the left side of the neck and jaw.  However, he feels as if he needs some work on the right side now as the side feels a little tighter than the left with endrange movements.  Additionally, he would like to focus on the left mid back/intrascapular region which always feels \"stuck\".  He does report an incident when he was running and he slipped and fell landing on his back.  He is curious if this may have been the trigger for his pain.  He has had his massage therapist work on it at length without significant relief.  Also, he reports that he felt as if he could walk better after our last treatment but today the left hip and sacrum feel tight again.  Will try to recreate the treatment that was performed to the left hip and sacrum previously.    INITIAL INTAKE/SUBJECTIVE 12/19/23: he did see Dr. Anson De Leon on 11/30/2023 for this same complaint of chronic left lower extremity tightness and hip pain.  There is also some mild left calf atrophy but this is more likely related to a history of tib-fib fracture.  His flatfoot is slightly more pronounced on the left as well.  An MRI has been ordered of the left hip- this has not yet been scheduled/done. Dr. Graves believes that manipulation of the left SI joint may help with his recovery as well as potentially some dry needling to help with myofascial tightness and imbalance.      He explains that his complaints today started " "approximately 10 years ago.  He did have an issue with a feeling that through his left jaw off and was starting to cause TMJ and trigeminal pain.  This was corrected however \"the damage has been done\".  Since then he has dealt with a lot of tightness in the left jaw, left suboccipital, scalenes, left pec and shoulder.  This is compounded by his left-sided ankle pain which started in the 90s after a left Achilles repair and exacerbated in the early 2000's with his left ankle fracture with hardware.  He recognizes now that the left arch does fall and he tends to pronate the foot, internally rotate the left knee and feels as if he is always twisted to the right.    He does work with a  one-on-one twice a week and is trying desperately to strengthen the left side of his body.  He feels as if it is weak because he is unable to properly engage the muscles.  He has been to the chiropractor once but did not have a true manipulation instead there was some sort of mechanical device that was used.  He did not find any benefit with this.    Given recent antibiotic, 12/8/2023, due to some sinusitis issues.  No other significant health issues.    Social history: He works in large commercial real estate development.  His wife is on the board for  [employer] babies and children.  He is the father of 3 young adults.    Review of Systems   Constitutional:  Negative for appetite change, fatigue and fever.   HENT:  Negative for congestion and hearing loss.    Eyes:  Negative for visual disturbance.   Respiratory:  Negative for shortness of breath.    Cardiovascular:  Negative for chest pain.   Gastrointestinal:  Negative for nausea and vomiting.        On Uloric for his gout and has not had an attack in a number of years.    Genitourinary:  Negative for dysuria.   Musculoskeletal:          Tib/fib Fx in 2005 playing kickball with kids.   IMAGING (Dr. Graves): CT of cervical spine from 2019 did show some " spondylosis/facet arthropathy worst at C5-6 but no significant stenosis.  MRI brachial plexus from 2022 was normal, and I did personally interpret his lumbar MRI from 2021, which shows mild degenerative changes at L4-5 and L5-S1, there is a small disc bulge at L4-5 with subarticular stenosis but this is more prominent to the right side and does not fit with current symptoms.   Skin:  Negative for rash and wound.   Neurological:  Negative for dizziness and weakness.   Psychiatric/Behavioral:  Negative for confusion.    All other systems reviewed and are negative.    Hip Xrays 1/2024: BL mild OA hips    Objective   Observation : normal gait and normal posture. L ankle pronation.     Physical Exam    Examination findings (palpation & ROM): Tenderness, hypertonicity and trigger points palpated throughout the R upper trapezius, R levator scapula, left rhomboids, L middle/lower trap, L intercostals, L thoracic paraspinals, L QL,  left iliolumbar ligament and L hip flexor.  Left heel to buttock was reduced compared to the right with left-sided low back pain and quad stiffness.  Reported left medial knee pain.  Also trigger points and tenderness with subscapularis palpation in the axilla.    Segmental joint dysfunction was identified in the following areas using motion palpation and/or pain provocation assessment:  Cervical: C0-C4 (Supine)  Thoracic: T4-T7 (Prone)  Lumbopelvic: L4L5/S1, L SIJ (Side-posture and Drop)    Today's treatment:  Performed spinal manipulation to the regions of segmental dysfunction identified on examination using age-appropriate and injury specific force, and manual diversified technique.     Performed seated, supine and prone soft tissue manipulation including seated, supine and prone IASTM (instrument assisted soft tissue mobilization), MFR (Myofacial Release) and IC (ischemic compression) to the hypertonic muscles including L upper trapezius, L levator scapula, left rhomboids, L middle/lower  trap, L intercostals, L QL, L thoracic paraspinals, left iliolumbar ligament to patient tolerance.  Pin and stretch technique to the left subscapularis.  Dry needling to the left cervical paraspinals, left upper trapezius, L rhomboids, and left iliolumbar ligament/upper gluteal region (20 in, 20 out).  Ankle and calcaneus mobilization and traction in prone with some traction of the hip.  (Start time 3:00 pm , End Time 3:20 pm). 1 units.     Treatment Plan:   The patient and I discussed the risks and benefits of chiropractic care. Based on the patient's subjective complaints along with the examination findings, it is advised that a course of chiropractic treatment by initiated. Consent for care was given both written and orally by the patient. The patient tolerated today's treatment with little or no additional discomfort and was instructed to contact the office for questions or concerns.     Treatment Frequency: Will see/treat patient every 2-4 weeks as therapeutic benefit is sustained, then frequency will be reduced to as needed for symptom management or as needed for acute flare-ups/exacerbation.     Please note: Voice-to-text software was used when completing this note.  While the note was proofread, portions may include grammatical errors.  Please contact me with any questions/concerns as it relates to these types of errors.

## 2024-05-24 ENCOUNTER — TELEPHONE (OUTPATIENT)
Dept: PRIMARY CARE | Facility: CLINIC | Age: 59
End: 2024-05-24
Payer: COMMERCIAL

## 2024-05-24 DIAGNOSIS — R10.84 GENERALIZED ABDOMINAL PAIN: Primary | ICD-10-CM

## 2024-05-24 RX ORDER — FEBUXOSTAT 40 MG/1
40 TABLET, FILM COATED ORAL DAILY
Qty: 7 TABLET | Refills: 0 | Status: SHIPPED | OUTPATIENT
Start: 2024-05-24 | End: 2024-06-10 | Stop reason: SDUPTHER

## 2024-05-25 ENCOUNTER — HOSPITAL ENCOUNTER (OUTPATIENT)
Dept: RADIOLOGY | Facility: HOSPITAL | Age: 59
Discharge: HOME | End: 2024-05-25
Payer: COMMERCIAL

## 2024-05-25 DIAGNOSIS — R10.84 GENERALIZED ABDOMINAL PAIN: ICD-10-CM

## 2024-05-25 PROCEDURE — 74176 CT ABD & PELVIS W/O CONTRAST: CPT

## 2024-05-25 PROCEDURE — 2550000001 HC RX 255 CONTRASTS: Performed by: INTERNAL MEDICINE

## 2024-05-25 PROCEDURE — A9698 NON-RAD CONTRAST MATERIALNOC: HCPCS | Performed by: INTERNAL MEDICINE

## 2024-05-25 PROCEDURE — 74176 CT ABD & PELVIS W/O CONTRAST: CPT | Performed by: RADIOLOGY

## 2024-05-25 RX ADMIN — IOHEXOL 500 ML: 12 SOLUTION ORAL at 09:19

## 2024-06-10 DIAGNOSIS — M1A.9XX0 CHRONIC GOUT WITHOUT TOPHUS, UNSPECIFIED CAUSE, UNSPECIFIED SITE: ICD-10-CM

## 2024-06-10 RX ORDER — FEBUXOSTAT 40 MG/1
40 TABLET, FILM COATED ORAL DAILY
Qty: 30 TABLET | Refills: 6 | Status: SHIPPED | OUTPATIENT
Start: 2024-06-10

## 2024-06-11 ENCOUNTER — ALLIED HEALTH (OUTPATIENT)
Dept: INTEGRATIVE MEDICINE | Facility: CLINIC | Age: 59
End: 2024-06-11
Payer: COMMERCIAL

## 2024-06-11 DIAGNOSIS — M24.552 HIP FLEXOR TENDON TIGHTNESS, LEFT: ICD-10-CM

## 2024-06-11 DIAGNOSIS — M54.2 NECK PAIN: ICD-10-CM

## 2024-06-11 DIAGNOSIS — M79.9 POSTURAL STRAIN: ICD-10-CM

## 2024-06-11 DIAGNOSIS — M99.03 SEGMENTAL AND SOMATIC DYSFUNCTION OF LUMBAR REGION: ICD-10-CM

## 2024-06-11 DIAGNOSIS — M99.01 SEGMENTAL AND SOMATIC DYSFUNCTION OF CERVICAL REGION: Primary | ICD-10-CM

## 2024-06-11 DIAGNOSIS — M99.05 SEGMENTAL AND SOMATIC DYSFUNCTION OF PELVIC REGION: ICD-10-CM

## 2024-06-11 DIAGNOSIS — M79.10 MYALGIA: ICD-10-CM

## 2024-06-11 DIAGNOSIS — G44.86 CERVICOGENIC HEADACHE: ICD-10-CM

## 2024-06-11 DIAGNOSIS — M99.02 SEGMENTAL AND SOMATIC DYSFUNCTION OF THORACIC REGION: ICD-10-CM

## 2024-06-11 DIAGNOSIS — M53.3 SACRAL BACK PAIN: ICD-10-CM

## 2024-06-11 DIAGNOSIS — M99.04 SEGMENTAL AND SOMATIC DYSFUNCTION OF SACRAL REGION: ICD-10-CM

## 2024-06-11 DIAGNOSIS — M99.00 SEGMENTAL AND SOMATIC DYSFUNCTION OF HEAD REGION: ICD-10-CM

## 2024-06-11 DIAGNOSIS — M25.512 CHRONIC LEFT SHOULDER PAIN: ICD-10-CM

## 2024-06-11 DIAGNOSIS — G89.29 CHRONIC LEFT SHOULDER PAIN: ICD-10-CM

## 2024-06-11 PROCEDURE — 98941 CHIROPRACT MANJ 3-4 REGIONS: CPT | Performed by: CHIROPRACTOR

## 2024-06-11 PROCEDURE — 97140 MANUAL THERAPY 1/> REGIONS: CPT | Performed by: CHIROPRACTOR

## 2024-06-11 ASSESSMENT — ENCOUNTER SYMPTOMS
WEAKNESS: 0
FATIGUE: 0
DYSURIA: 0
FEVER: 0
DIZZINESS: 0
WOUND: 0
SHORTNESS OF BREATH: 0
CONFUSION: 0
APPETITE CHANGE: 0
VOMITING: 0
NAUSEA: 0

## 2024-06-13 ENCOUNTER — TELEPHONE (OUTPATIENT)
Dept: PRIMARY CARE | Facility: CLINIC | Age: 59
End: 2024-06-13
Payer: COMMERCIAL

## 2024-06-13 NOTE — TELEPHONE ENCOUNTER
Patient went to work-out session today that she has been doing for a while.  She is not sure what she did different, now she is ion a lot of pain, hardly can walk.  She is taking tylenol and advil and is not sure how much more she can take.    She would like to speak to a doctor.    Please call  [phone]

## 2024-06-13 NOTE — TELEPHONE ENCOUNTER
Patient went to work-out session today that she has been doing for a while.  She is not sure what she did different, now she is ion a lot of pain, hardly can walk.  She is taking tylenol and advil and is not sure how much more she can take.    She would like to speak to a doctor.    Please call  [phone]
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
## 2024-06-21 ENCOUNTER — APPOINTMENT (OUTPATIENT)
Dept: CARDIOLOGY | Facility: CLINIC | Age: 59
End: 2024-06-21
Payer: COMMERCIAL

## 2024-06-21 ENCOUNTER — LAB (OUTPATIENT)
Dept: LAB | Facility: LAB | Age: 59
End: 2024-06-21
Payer: COMMERCIAL

## 2024-06-21 ENCOUNTER — OFFICE VISIT (OUTPATIENT)
Dept: CARDIOLOGY | Facility: CLINIC | Age: 59
End: 2024-06-21
Payer: COMMERCIAL

## 2024-06-21 VITALS
HEART RATE: 68 BPM | WEIGHT: 202 LBS | DIASTOLIC BLOOD PRESSURE: 70 MMHG | HEIGHT: 68 IN | OXYGEN SATURATION: 97 % | SYSTOLIC BLOOD PRESSURE: 108 MMHG | BODY MASS INDEX: 30.62 KG/M2

## 2024-06-21 DIAGNOSIS — E78.49 OTHER HYPERLIPIDEMIA: Primary | ICD-10-CM

## 2024-06-21 DIAGNOSIS — E78.49 OTHER HYPERLIPIDEMIA: ICD-10-CM

## 2024-06-21 LAB
ATRIAL RATE: 69 BPM
CHOLEST SERPL-MCNC: 221 MG/DL (ref 0–199)
CHOLESTEROL/HDL RATIO: 4.2
HDLC SERPL-MCNC: 52.2 MG/DL
LDLC SERPL CALC-MCNC: 147 MG/DL
NON HDL CHOLESTEROL: 169 MG/DL (ref 0–149)
P AXIS: 74 DEGREES
P OFFSET: 179 MS
P ONSET: 128 MS
PR INTERVAL: 152 MS
Q ONSET: 204 MS
QRS COUNT: 12 BEATS
QRS DURATION: 90 MS
QT INTERVAL: 374 MS
QTC CALCULATION(BAZETT): 400 MS
QTC FREDERICIA: 392 MS
R AXIS: 85 DEGREES
T AXIS: 71 DEGREES
T OFFSET: 391 MS
TRIGL SERPL-MCNC: 110 MG/DL (ref 0–149)
VENTRICULAR RATE: 69 BPM
VLDL: 22 MG/DL (ref 0–40)

## 2024-06-21 PROCEDURE — 99204 OFFICE O/P NEW MOD 45 MIN: CPT | Performed by: INTERNAL MEDICINE

## 2024-06-21 PROCEDURE — 93005 ELECTROCARDIOGRAM TRACING: CPT | Performed by: INTERNAL MEDICINE

## 2024-06-21 PROCEDURE — 36415 COLL VENOUS BLD VENIPUNCTURE: CPT

## 2024-06-21 PROCEDURE — 82172 ASSAY OF APOLIPOPROTEIN: CPT

## 2024-06-21 PROCEDURE — 80061 LIPID PANEL: CPT

## 2024-06-21 PROCEDURE — 99214 OFFICE O/P EST MOD 30 MIN: CPT | Performed by: INTERNAL MEDICINE

## 2024-06-21 ASSESSMENT — PAIN SCALES - GENERAL: PAINLEVEL: 0-NO PAIN

## 2024-06-22 LAB — LPA SERPL-MCNC: 46 MG/DL

## 2024-06-25 ENCOUNTER — TELEPHONE (OUTPATIENT)
Dept: CARDIOLOGY | Facility: CLINIC | Age: 59
End: 2024-06-25
Payer: COMMERCIAL

## 2024-06-25 NOTE — TELEPHONE ENCOUNTER
----- Message from Tano Pineda DO sent at 6/24/2024  9:38 AM EDT -----  His lipid levels are similar to previous.  Also elevated lipoprotein a so would recommend to him taking Repatha.  He wanted me to send to [facility] so I will do this at his request.  Will see if they need a PA.

## 2024-06-25 NOTE — TELEPHONE ENCOUNTER
PA completed and approved. TCT patient and left VMM requesting return call to discuss results and therapy plan

## 2024-06-26 LAB
ATRIAL RATE: 69 BPM
P AXIS: 74 DEGREES
P OFFSET: 179 MS
P ONSET: 128 MS
PR INTERVAL: 152 MS
Q ONSET: 204 MS
QRS COUNT: 12 BEATS
QRS DURATION: 90 MS
QT INTERVAL: 374 MS
QTC CALCULATION(BAZETT): 400 MS
QTC FREDERICIA: 392 MS
R AXIS: 85 DEGREES
T AXIS: 71 DEGREES
T OFFSET: 391 MS
VENTRICULAR RATE: 69 BPM

## 2024-07-11 ENCOUNTER — APPOINTMENT (OUTPATIENT)
Dept: ENDOCRINOLOGY | Facility: CLINIC | Age: 59
End: 2024-07-11
Payer: COMMERCIAL

## 2024-07-11 VITALS — BODY MASS INDEX: 31.17 KG/M2 | SYSTOLIC BLOOD PRESSURE: 120 MMHG | WEIGHT: 205 LBS | DIASTOLIC BLOOD PRESSURE: 74 MMHG

## 2024-07-11 DIAGNOSIS — E29.1 HYPOGONADISM MALE: Primary | ICD-10-CM

## 2024-07-11 DIAGNOSIS — E23.6 EMPTY SELLA SYNDROME (MULTI): ICD-10-CM

## 2024-07-11 DIAGNOSIS — E78.5 HYPERLIPIDEMIA, UNSPECIFIED HYPERLIPIDEMIA TYPE: ICD-10-CM

## 2024-07-11 PROCEDURE — 99214 OFFICE O/P EST MOD 30 MIN: CPT | Performed by: INTERNAL MEDICINE

## 2024-07-11 NOTE — PROGRESS NOTES
Patient ID: Link Reyes is a 58 y.o. male who presents for Follow-up.  HPI  The patient comes in for follow up.    He has hypogonadism with empty sella hyperlipidemia vitamin D deficiency And GERD.    He continues on AndroGel 2 pumps per day.    He has had no ill effects from testosterone.    He continues to note improvement in energy focus concentration.    He has been doing a lot of exercise.    He is now on Repatha.    He was in the ER in May with constipation.    Physically he has no other complaints.    ROS  Comprehensive review of systems is negative.    Objective   Physical Exam  Visit Vitals  /74      Vitals:    07/11/24 0836   Weight: 93 kg (205 lb)      Body mass index is 31.17 kg/m².      Weight 205 down 4 pounds    Eyes normal  ENT normal. No adenopathy  Thyroid palpable and normal. No nodules  Chest clear to auscultation  Heart sounds are normal  Abdomen nontender. Bowel sounds normal. No organomegaly  Feet are okay    Current Outpatient Medications   Medication Sig Dispense Refill    cholecalciferol (Vitamin D-3) 50 MCG (2000 UT) tablet Take 2 tablets (100 mcg) by mouth once daily.      colchicine, gout, (Colcrys) 0.6 mg tablet Take 1 tablet (0.6 mg) by mouth once daily. As needed for flare up of gout.      ergocalciferol (Vitamin D-2) 50 MCG (2000 UT) capsule capsule Take 2 capsules (100 mcg) by mouth once daily.      evolocumab (Repatha SureClick) 140 mg/mL injection Inject 1 mL (140 mg) under the skin every 14 (fourteen) days. 2 mL 11    febuxostat (Uloric) 40 mg tablet Take 1 tablet (40 mg) by mouth once daily. 30 tablet 6    Proscar 5 mg tablet Take 0.25 tablets by mouth every other day. Every other day      sod picosulf-mag ox-citric ac (Clenpiq) 10 mg-3.5 gram- 12 gram/175 mL solution Take 1 Box by mouth see administration instructions. Starting at 6pm night before procedure drink 1 bottle as per instructions, then 5 hours before procedure time drink 2nd as instructed 175 mL 0     tadalafil (Cialis) 5 mg tablet Take 1 tablet (5 mg) by mouth once daily. 15 tablet 11    testosterone 20.25 mg/1.25 gram (1.62 %) gel in metered-dose pump Apply 2 Pump topically once daily. 75 g 5    turmeric root extract 500 mg tablet Take 1 tablet by mouth once daily.       No current facility-administered medications for this visit.       Assessment/Plan     1.  Hypogonadism  2.  Empty sella  3.  Hyperlipidemia    Will check testosterone PSA CBC TSH and free T4.    He will continue his current regimen.    I have personally reviewed the OARRS report for this patient. This report is scanned into the electronic medical record. I consider the risks of abuse, dependence, addiction and diversion. I believe that is clinically appropriate for this patient to be prescribed this medication.    He will work on diet and exercise.    He will follow-up with me in 1 year or sooner as needed.

## 2024-08-08 ENCOUNTER — LAB (OUTPATIENT)
Dept: LAB | Facility: LAB | Age: 59
End: 2024-08-08
Payer: COMMERCIAL

## 2024-08-08 DIAGNOSIS — E23.6 EMPTY SELLA SYNDROME (MULTI): ICD-10-CM

## 2024-08-08 DIAGNOSIS — E29.1 HYPOGONADISM MALE: ICD-10-CM

## 2024-08-08 LAB
ERYTHROCYTE [DISTWIDTH] IN BLOOD BY AUTOMATED COUNT: 12.6 % (ref 11.5–14.5)
HCT VFR BLD AUTO: 44.2 % (ref 41–52)
HGB BLD-MCNC: 15.2 G/DL (ref 13.5–17.5)
MCH RBC QN AUTO: 29.1 PG (ref 26–34)
MCHC RBC AUTO-ENTMCNC: 34.4 G/DL (ref 32–36)
MCV RBC AUTO: 85 FL (ref 80–100)
NRBC BLD-RTO: 0 /100 WBCS (ref 0–0)
PLATELET # BLD AUTO: 185 X10*3/UL (ref 150–450)
PSA SERPL-MCNC: 0.52 NG/ML
RBC # BLD AUTO: 5.23 X10*6/UL (ref 4.5–5.9)
T4 FREE SERPL-MCNC: 1.14 NG/DL (ref 0.78–1.48)
TESTOST SERPL-MCNC: 636 NG/DL (ref 240–1000)
TSH SERPL-ACNC: 1.78 MIU/L (ref 0.44–3.98)
WBC # BLD AUTO: 4.5 X10*3/UL (ref 4.4–11.3)

## 2024-08-08 PROCEDURE — 36415 COLL VENOUS BLD VENIPUNCTURE: CPT

## 2024-08-08 PROCEDURE — 85027 COMPLETE CBC AUTOMATED: CPT

## 2024-08-08 PROCEDURE — 84403 ASSAY OF TOTAL TESTOSTERONE: CPT

## 2024-08-08 PROCEDURE — 84153 ASSAY OF PSA TOTAL: CPT

## 2024-08-08 PROCEDURE — 84439 ASSAY OF FREE THYROXINE: CPT

## 2024-08-08 PROCEDURE — 84443 ASSAY THYROID STIM HORMONE: CPT

## 2024-08-23 ENCOUNTER — OFFICE VISIT (OUTPATIENT)
Dept: PRIMARY CARE | Facility: CLINIC | Age: 59
End: 2024-08-23
Payer: COMMERCIAL

## 2024-08-23 VITALS — OXYGEN SATURATION: 98 % | DIASTOLIC BLOOD PRESSURE: 76 MMHG | HEART RATE: 71 BPM | SYSTOLIC BLOOD PRESSURE: 110 MMHG

## 2024-08-23 DIAGNOSIS — J31.0 CHRONIC RHINITIS: ICD-10-CM

## 2024-08-23 DIAGNOSIS — E78.5 HYPERLIPIDEMIA, UNSPECIFIED HYPERLIPIDEMIA TYPE: Primary | ICD-10-CM

## 2024-08-23 PROCEDURE — 99213 OFFICE O/P EST LOW 20 MIN: CPT | Performed by: INTERNAL MEDICINE

## 2024-08-23 RX ORDER — MOMETASONE FUROATE MONOHYDRATE 50 UG/1
1 SPRAY, METERED NASAL 2 TIMES DAILY
Qty: 17 G | Refills: 11 | Status: SHIPPED | OUTPATIENT
Start: 2024-08-23 | End: 2025-08-23

## 2024-08-23 RX ORDER — MOMETASONE FUROATE MONOHYDRATE 50 UG/1
1 SPRAY, METERED NASAL 2 TIMES DAILY
Qty: 17 G | Refills: 11 | Status: CANCELLED | OUTPATIENT
Start: 2024-08-23 | End: 2025-08-23

## 2024-08-23 ASSESSMENT — ENCOUNTER SYMPTOMS: DIZZINESS: 1

## 2024-08-23 NOTE — PROGRESS NOTES
Subjective   Patient ID: Link Reyes is a 58 y.o. male who presents for sinus pressure and Dizziness.    Patient is here with upper respiratory congestion is had now for the past couple weeks.  He describes sinus pressure and congestion mostly on the left side.  He has been doing a lot of flying and going up and down the airplanes where he notices a lot of pressure building up mostly on his left ear.  His right ear seems fairly clear.  He denies any facial pain fever or purulent sputum.  He has no cough or shortness of breath.    Dizziness         Review of Systems   Neurological:  Positive for dizziness.       Objective   /76   Pulse 71   SpO2 98%     Physical Exam  HENT:      Right Ear: Tympanic membrane normal.      Left Ear: Tympanic membrane normal.      Nose: Nose normal. No rhinorrhea.      Mouth/Throat:      Pharynx: No oropharyngeal exudate or posterior oropharyngeal erythema.   Lymphadenopathy:      Cervical: No cervical adenopathy.         Assessment/Plan   Problem List Items Addressed This Visit             ICD-10-CM    Chronic rhinitis J31.0     Most likely eustachian tube dysfunction leading to ear pressure and congestion and some mild vertigo symptoms.  Patient is given a prescription for Nasonex inhaler.  He is also can use Afrin nasal spray for 4 days only as well as Mucinex D.  He can also use saline mist if necessary.  He will call if symptoms do not improve.         Hyperlipidemia - Primary E78.5     Patient now doing home Repatha injections tolerating them without side effects.  Will recheck lipids in a few more weeks.         Relevant Medications    mometasone (Nasonex) 50 mcg/actuation nasal spray    Other Relevant Orders    Lipid Panel    Comprehensive Metabolic Panel

## 2024-08-23 NOTE — ASSESSMENT & PLAN NOTE
Patient now doing home Repatha injections tolerating them without side effects.  Will recheck lipids in a few more weeks.

## 2024-08-23 NOTE — ASSESSMENT & PLAN NOTE
Most likely eustachian tube dysfunction leading to ear pressure and congestion and some mild vertigo symptoms.  Patient is given a prescription for Nasonex inhaler.  He is also can use Afrin nasal spray for 4 days only as well as Mucinex D.  He can also use saline mist if necessary.  He will call if symptoms do not improve.

## 2024-09-06 ENCOUNTER — PHARMACY VISIT (OUTPATIENT)
Dept: PHARMACY | Facility: CLINIC | Age: 59
End: 2024-09-06
Payer: MEDICARE

## 2024-09-06 ENCOUNTER — HOSPITAL ENCOUNTER (OUTPATIENT)
Dept: RADIOLOGY | Facility: HOSPITAL | Age: 59
Discharge: HOME | End: 2024-09-06
Payer: COMMERCIAL

## 2024-09-06 ENCOUNTER — OFFICE VISIT (OUTPATIENT)
Dept: PRIMARY CARE | Facility: CLINIC | Age: 59
End: 2024-09-06
Payer: COMMERCIAL

## 2024-09-06 VITALS — HEART RATE: 90 BPM | DIASTOLIC BLOOD PRESSURE: 72 MMHG | OXYGEN SATURATION: 95 % | SYSTOLIC BLOOD PRESSURE: 120 MMHG

## 2024-09-06 DIAGNOSIS — J06.9 URI, ACUTE: Primary | ICD-10-CM

## 2024-09-06 DIAGNOSIS — E78.5 HYPERLIPIDEMIA, UNSPECIFIED HYPERLIPIDEMIA TYPE: ICD-10-CM

## 2024-09-06 DIAGNOSIS — J06.9 URI, ACUTE: ICD-10-CM

## 2024-09-06 PROCEDURE — 71046 X-RAY EXAM CHEST 2 VIEWS: CPT

## 2024-09-06 PROCEDURE — RXMED WILLOW AMBULATORY MEDICATION CHARGE

## 2024-09-06 PROCEDURE — 99213 OFFICE O/P EST LOW 20 MIN: CPT | Performed by: INTERNAL MEDICINE

## 2024-09-06 RX ORDER — AZITHROMYCIN 250 MG/1
TABLET, FILM COATED ORAL
Qty: 6 TABLET | Refills: 0 | Status: SHIPPED | OUTPATIENT
Start: 2024-09-06 | End: 2024-09-11

## 2024-09-06 RX ORDER — AZITHROMYCIN 250 MG/1
TABLET, FILM COATED ORAL
Qty: 6 TABLET | Refills: 0 | Status: SHIPPED | OUTPATIENT
Start: 2024-09-06 | End: 2024-09-06 | Stop reason: SDUPTHER

## 2024-09-06 NOTE — ASSESSMENT & PLAN NOTE
Patient will get a stat chest x-ray and also do a COVID test.  If the x-ray is clear and his COVID test is negative we will treat him as bronchitis if his COVID test is positive we will treat him accordingly if his chest x-ray shows any evidence of pneumonia we will treat him accordingly.

## 2024-09-06 NOTE — ASSESSMENT & PLAN NOTE
Patient is now using Repatha and has completed the in-home injections.  Will repeat his lipids today.

## 2024-09-06 NOTE — PROGRESS NOTES
Patient is here for Subjective   Patient ID: Link Reyes is a 58 y.o. male who presents for No chief complaint on file..    Persistent upper respiratory infection which she has now had for the past several weeks.  He saw me earlier in August with sinus pressure congestion etc. we treated him with decongestants as we did not think he had an acute infectious cause at that time.  He did get good relief initially.  And his symptoms started to clear however over the last 72 hours she has had increasing cough and generalized fatigue and weakness.  He has not had no fever but does feel generally warm and fatigued.  He denies any dyspnea chest pain nausea or vomiting.  He has not had a COVID test.         Review of Systems    Objective   /72   Pulse 90   SpO2 95%     Physical Exam  Cardiovascular:      Rate and Rhythm: Normal rate and regular rhythm.      Heart sounds: Normal heart sounds.   Pulmonary:      Breath sounds: Normal breath sounds.         Assessment/Plan   Problem List Items Addressed This Visit             ICD-10-CM    Hyperlipidemia E78.5     Patient is now using Repatha and has completed the in-home injections.  Will repeat his lipids today.         Relevant Orders    Lipid Panel    Comprehensive Metabolic Panel    URI, acute - Primary J06.9     Patient will get a stat chest x-ray and also do a COVID test.  If the x-ray is clear and his COVID test is negative we will treat him as bronchitis if his COVID test is positive we will treat him accordingly if his chest x-ray shows any evidence of pneumonia we will treat him accordingly.         Relevant Medications    azithromycin (Zithromax) 250 mg tablet    Other Relevant Orders    XR chest 2 views

## 2024-09-11 DIAGNOSIS — J31.0 CHRONIC RHINITIS: Primary | ICD-10-CM

## 2024-09-11 RX ORDER — METHYLPREDNISOLONE 4 MG/1
TABLET ORAL
Qty: 21 TABLET | Refills: 0 | Status: SHIPPED | OUTPATIENT
Start: 2024-09-11 | End: 2024-09-18

## 2024-09-11 RX ORDER — ALBUTEROL SULFATE 90 UG/1
2 INHALANT RESPIRATORY (INHALATION) EVERY 4 HOURS PRN
Qty: 6.7 G | Refills: 0 | Status: SHIPPED | OUTPATIENT
Start: 2024-09-11 | End: 2025-09-11

## 2024-09-24 DIAGNOSIS — J06.9 URI, ACUTE: Primary | ICD-10-CM

## 2024-09-24 RX ORDER — BENZONATATE 200 MG/1
200 CAPSULE ORAL 3 TIMES DAILY PRN
Qty: 42 CAPSULE | Refills: 0 | Status: SHIPPED | OUTPATIENT
Start: 2024-09-24 | End: 2024-10-24

## 2024-10-02 ENCOUNTER — ALLIED HEALTH (OUTPATIENT)
Dept: INTEGRATIVE MEDICINE | Facility: CLINIC | Age: 59
End: 2024-10-02
Payer: COMMERCIAL

## 2024-10-02 DIAGNOSIS — M99.04 SEGMENTAL AND SOMATIC DYSFUNCTION OF SACRAL REGION: ICD-10-CM

## 2024-10-02 DIAGNOSIS — M54.2 NECK PAIN: ICD-10-CM

## 2024-10-02 DIAGNOSIS — M79.10 MYALGIA: ICD-10-CM

## 2024-10-02 DIAGNOSIS — M99.02 SEGMENTAL AND SOMATIC DYSFUNCTION OF THORACIC REGION: ICD-10-CM

## 2024-10-02 DIAGNOSIS — M79.9 POSTURAL STRAIN: ICD-10-CM

## 2024-10-02 DIAGNOSIS — M53.3 SACRAL BACK PAIN: ICD-10-CM

## 2024-10-02 DIAGNOSIS — M99.01 SEGMENTAL AND SOMATIC DYSFUNCTION OF CERVICAL REGION: ICD-10-CM

## 2024-10-02 DIAGNOSIS — M99.03 SEGMENTAL AND SOMATIC DYSFUNCTION OF LUMBAR REGION: Primary | ICD-10-CM

## 2024-10-02 PROCEDURE — 97112 NEUROMUSCULAR REEDUCATION: CPT | Performed by: CHIROPRACTOR

## 2024-10-02 PROCEDURE — 98941 CHIROPRACT MANJ 3-4 REGIONS: CPT | Performed by: CHIROPRACTOR

## 2024-10-02 NOTE — PROGRESS NOTES
"Subjective   Patient ID: Link Reyes is a 59 y.o. male who presents October 2, 2024 for chiropractic care.    (11) Visits 2024    Today, the patient rates their degree of pain as a 3 out of 10 on the numeric pain rating scale.     Link Reyes returns for continued chiropractic care. He was previously under the care of my colleague, Dr. Connelly. Today, he states that he has been doing well. He notes that he continues to experience periods where he feels \"unbalanced\" due to the left-sided trauma. He states that working with Matt Morenos has been great and really providing some stability to the smaller muscle groups. The patient denies any changes in health since his last encounter and will follow up as scheduled.    _______________________________________________  HPI -all in all he is still doing better regarding the headache and jaw pain and feeling a little bit more \"balanced\".  Unfortunately he did not tolerate the drop table technique well and has had some sacral/tailbone pain since her last treatment encounter.  He does feel that this is improving slowly with time and he has also been taking some over-the-counter Motrin which eliminates the pain completely.  He also continues to have some pain over the left greater than right neck which was mildly exacerbated with cervical traction.  He would like to avoid drop table and traction of the neck today but all other techniques have been extremely beneficial and helpful.     He has his consultation with MACY Sesay on Wednesday.  He is happy to get going on this and to see if he has any additional insights on how to get the firing patterns of his postural muscles more balanced and ideal.      Objective   Physical Exam  Neurological:      General: No focal deficit present.      Mental Status: He is alert and oriented to person, place, and time.      Cranial Nerves: No dysarthria or facial asymmetry.      Sensory: Sensation is intact.      " Motor: Motor function is intact.      Coordination: Coordination is intact.      Gait: Gait is intact.       Palpation of the following region(s) revealed:  Cervical: Scalenes bilateral, muscular hypertonicity.  Upper trapezius bilateral, muscular hypertonicity.  Levator scapulae bilateral, muscular hypertonicity.  Cervical paraspinals bilateral, muscular hypertonicity.  Thoracic: Thoracic paraspinals bilateral, muscular hypertonicity.  Rhomboids bilateral, muscular hypertonicity.  Pectoralis bilateral, muscular hypertonicity.  Latissimus dorsi bilateral, muscular hypertonicity.  Lumbar: Lumbar paraspinals bilateral, muscular hypertonicity.  Gluteal bilateral, muscular hypertonicity.  Piriformis bilateral, muscular hypertonicity.  Psoas bilateral, muscular hypertonicity.    Segmental Joint(s): Segmental joint dysfunction was assessed with motion palpation and is identified in the following areas:  Cervical : C2 C5  Thoracic : T4, T5, and T8  Lumbopelvic / Sacral SIJ : L4, L5/S1, and R SIJ    Assessment/Plan   Today's Treatment Included: Spinal manipulation to the following regions of segmental dysfunction identified on examination, using age-appropriate and injury specific force. Segmental Joint(s) Cervical : C2 C4 C5 Segmental Joint(s) Thoracic : T4, T5, and T6 Segmental Joint(s) Lumbopelvic/Sacral SIJ : L5/S1, R SIJ, and L SIJ  Chiropractic manipulation treatment techniques utilized: Diversified CMT and Pelvic drop table technique  Soft tissue mobilization techniques utilized during treatment: IASTM/Graston and Cupping  Integrative Dry Needling (IDN) - Needles in/out:  11.  Neuromuscular Re-Education (91835): 1 Units; Start time: 8:30a  End time: 8:45a      Soft-tissue mobilization was performed in the following areas:   Cervical paraspinal mm. bilateral, Scalenes bilateral, Upper Trapezius bilateral, and Levator Scap. bilateral  Thoracic Paraspinal mm. bilateral, Rhomboids bilateral, Subscapularis bilateral, and  Latissimus Dorsi bilateral  Lumbar Paraspinal mm. bilateral, Gluteal mm. Glute. Max.  and Glute. Med. bilateral, Piriformis bilateral, and Psoas bilateral    The patient tolerated today's treatment with little or no additional discomfort and was instructed to contact the office for questions or concerns.

## 2024-10-11 DIAGNOSIS — E29.1 HYPOGONADISM MALE: ICD-10-CM

## 2024-10-11 RX ORDER — TESTOSTERONE 20.25 MG/1.25G
2 GEL TOPICAL DAILY
Qty: 75 G | Refills: 5 | Status: SHIPPED | OUTPATIENT
Start: 2024-10-11

## 2024-11-12 ENCOUNTER — LAB (OUTPATIENT)
Dept: LAB | Facility: LAB | Age: 59
End: 2024-11-12
Payer: COMMERCIAL

## 2024-11-12 DIAGNOSIS — E78.5 HYPERLIPIDEMIA, UNSPECIFIED HYPERLIPIDEMIA TYPE: ICD-10-CM

## 2024-11-12 LAB
ALBUMIN SERPL BCP-MCNC: 4.7 G/DL (ref 3.4–5)
ALP SERPL-CCNC: 49 U/L (ref 33–136)
ALT SERPL W P-5'-P-CCNC: 21 U/L (ref 10–52)
ANION GAP SERPL CALC-SCNC: 10 MMOL/L (ref 10–20)
AST SERPL W P-5'-P-CCNC: 20 U/L (ref 9–39)
BILIRUB SERPL-MCNC: 0.6 MG/DL (ref 0–1.2)
BUN SERPL-MCNC: 18 MG/DL (ref 6–23)
CALCIUM SERPL-MCNC: 9.9 MG/DL (ref 8.6–10.6)
CHLORIDE SERPL-SCNC: 103 MMOL/L (ref 98–107)
CHOLEST SERPL-MCNC: 124 MG/DL (ref 0–199)
CHOLESTEROL/HDL RATIO: 2.6
CO2 SERPL-SCNC: 32 MMOL/L (ref 21–32)
CREAT SERPL-MCNC: 1.13 MG/DL (ref 0.5–1.3)
EGFRCR SERPLBLD CKD-EPI 2021: 74 ML/MIN/1.73M*2
GLUCOSE SERPL-MCNC: 103 MG/DL (ref 74–99)
HDLC SERPL-MCNC: 47.7 MG/DL
LDLC SERPL CALC-MCNC: 50 MG/DL
NON HDL CHOLESTEROL: 76 MG/DL (ref 0–149)
POTASSIUM SERPL-SCNC: 4.5 MMOL/L (ref 3.5–5.3)
PROT SERPL-MCNC: 7.5 G/DL (ref 6.4–8.2)
SODIUM SERPL-SCNC: 140 MMOL/L (ref 136–145)
TRIGL SERPL-MCNC: 132 MG/DL (ref 0–149)
VLDL: 26 MG/DL (ref 0–40)

## 2024-11-12 PROCEDURE — 80061 LIPID PANEL: CPT

## 2024-11-12 PROCEDURE — 80053 COMPREHEN METABOLIC PANEL: CPT

## 2024-11-12 PROCEDURE — 36415 COLL VENOUS BLD VENIPUNCTURE: CPT

## 2025-01-20 DIAGNOSIS — M1A.9XX0 CHRONIC GOUT WITHOUT TOPHUS, UNSPECIFIED CAUSE, UNSPECIFIED SITE: ICD-10-CM

## 2025-01-20 RX ORDER — FEBUXOSTAT 40 MG/1
40 TABLET, FILM COATED ORAL DAILY
Qty: 30 TABLET | Refills: 10 | Status: SHIPPED | OUTPATIENT
Start: 2025-01-20

## 2025-02-04 ENCOUNTER — TELEPHONE (OUTPATIENT)
Dept: PRIMARY CARE | Facility: CLINIC | Age: 60
End: 2025-02-04
Payer: COMMERCIAL

## 2025-02-04 DIAGNOSIS — J01.10 ACUTE NON-RECURRENT FRONTAL SINUSITIS: Primary | ICD-10-CM

## 2025-02-04 RX ORDER — DOXYCYCLINE 100 MG/1
100 TABLET ORAL 2 TIMES DAILY
Qty: 14 TABLET | Refills: 0 | Status: SHIPPED | OUTPATIENT
Start: 2025-02-04 | End: 2025-02-11

## 2025-02-04 NOTE — TELEPHONE ENCOUNTER
Patient is in [place] with cold/flu symptoms. He tested negative for covid.  He is looking for advice and medication. He has taken claritin D and used Afrin nasal spray. They worked for a while, but now do not provide much relief.

## 2025-02-04 NOTE — TELEPHONE ENCOUNTER
Started last Friday - had some relief w/ meds as noted   Afrin and Claritin-D no longer helping   COVID test neg   No fever/chills  No current fatigue  Sinus pressure in forehead and ear pain on Left   Discolored drainage   Will continue with approach to decongestion but look to limit Afrin and add    Requested Prescriptions     Signed Prescriptions Disp Refills    doxycycline (Adoxa) 100 mg tablet 14 tablet 0     Sig: Take 1 tablet (100 mg) by mouth 2 times a day for 7 days. Take with a full glass of water     Authorizing Provider: VERONICA CHANG     Expect will follow-up in 48-72 hours or sooner if worsening/concerns

## 2025-03-24 DIAGNOSIS — N52.9 MALE ERECTILE DISORDER: ICD-10-CM

## 2025-03-24 DIAGNOSIS — R39.9 LOWER URINARY TRACT SYMPTOMS (LUTS): ICD-10-CM

## 2025-05-06 DIAGNOSIS — E29.1 HYPOGONADISM MALE: ICD-10-CM

## 2025-05-06 RX ORDER — TESTOSTERONE 20.25 MG/1.25G
2 GEL TOPICAL DAILY
Qty: 75 G | Refills: 5 | Status: SHIPPED | OUTPATIENT
Start: 2025-05-06

## 2025-07-11 ENCOUNTER — APPOINTMENT (OUTPATIENT)
Dept: ENDOCRINOLOGY | Facility: CLINIC | Age: 60
End: 2025-07-11
Payer: COMMERCIAL

## 2025-07-30 ENCOUNTER — APPOINTMENT (OUTPATIENT)
Dept: ENDOCRINOLOGY | Facility: CLINIC | Age: 60
End: 2025-07-30
Payer: COMMERCIAL

## 2025-07-30 ENCOUNTER — TELEPHONE (OUTPATIENT)
Dept: PRIMARY CARE | Facility: CLINIC | Age: 60
End: 2025-07-30

## 2025-07-30 VITALS — WEIGHT: 209 LBS | BODY MASS INDEX: 31.78 KG/M2 | SYSTOLIC BLOOD PRESSURE: 122 MMHG | DIASTOLIC BLOOD PRESSURE: 74 MMHG

## 2025-07-30 DIAGNOSIS — E29.1 HYPOGONADISM MALE: Primary | ICD-10-CM

## 2025-07-30 DIAGNOSIS — E23.6 EMPTY SELLA SYNDROME (MULTI): ICD-10-CM

## 2025-07-30 DIAGNOSIS — E78.5 HYPERLIPIDEMIA, UNSPECIFIED HYPERLIPIDEMIA TYPE: ICD-10-CM

## 2025-07-30 PROCEDURE — 99214 OFFICE O/P EST MOD 30 MIN: CPT | Performed by: INTERNAL MEDICINE

## 2025-07-30 NOTE — PROGRESS NOTES
Patient ID: Link Reyes is a 59 y.o. male who presents for Follow-up (Hypogonadism male).  HPI  The patient comes in for follow up.    He has hypogonadism with empty sella hyperlipidemia vitamin D deficiency And GERD.    He continues on AndroGel 2 pumps per day.    He has had no ill effects from testosterone.    He continues to note improvement in energy focus concentration.    He has been doing a lot of exercise.    Physically he has no other complaints.      ROS  Comprehensive review of systems is negative.    Objective   Physical Exam  Visit Vitals  /74      Vitals:    07/30/25 1339   Weight: 94.8 kg (209 lb)      Body mass index is 31.78 kg/m².      Weight 209 up 4 pounds    Eyes normal  ENT normal. No adenopathy  Thyroid palpable and normal. No nodules  Chest clear to auscultation  Heart sounds are normal  Abdomen nontender. Bowel sounds normal. No organomegaly  Feet are okay    Current Medications[1]    Assessment/Plan     1.  Hypogonadism  2.  Empty sella  3.  Hyperlipidemia    Will check testosterone PSA and CBC today.    He will continue his current regimen.    I have personally reviewed the OARRS report for this patient. This report is scanned into the electronic medical record. I consider the risks of abuse, dependence, addiction and diversion. I believe that is clinically appropriate for this patient to be prescribed this medication.    He will work on diet and exercise.    He will follow-up with me in 1 year or sooner as needed.               [1]   Current Outpatient Medications   Medication Sig Dispense Refill    albuterol (Proventil HFA) 90 mcg/actuation inhaler Inhale 2 puffs every 4 hours if needed for wheezing or shortness of breath. 6.7 g 0    cholecalciferol (Vitamin D-3) 50 MCG (2000 UT) tablet Take 2 tablets (100 mcg) by mouth once daily.      colchicine, gout, (Colcrys) 0.6 mg tablet Take 1 tablet (0.6 mg) by mouth once daily. As needed for flare up of gout.       ergocalciferol (Vitamin D-2) 50 MCG (2000 UT) capsule capsule Take 2 capsules (100 mcg) by mouth once daily.      evolocumab (Repatha SureClick) 140 mg/mL injection Inject 1 mL (140 mg) under the skin every 14 (fourteen) days. 2 mL 11    febuxostat (Uloric) 40 mg tablet Take 1 tablet (40 mg) by mouth once daily. 30 tablet 10    mometasone (Nasonex) 50 mcg/actuation nasal spray Administer 1 spray into each nostril 2 times a day. 17 g 11    Proscar 5 mg tablet Take 0.25 tablets by mouth every other day. Every other day      sod picosulf-mag ox-citric ac (Clenpiq) 10 mg-3.5 gram- 12 gram/175 mL solution Take 1 Box by mouth see administration instructions. Starting at 6pm night before procedure drink 1 bottle as per instructions, then 5 hours before procedure time drink 2nd as instructed 175 mL 0    tadalafil (Cialis) 5 mg tablet Take 1 tablet (5 mg) by mouth once daily. 15 tablet 11    testosterone 20.25 mg/1.25 gram (1.62 %) gel in metered-dose pump Apply 2 Pump topically once daily. 75 g 5    turmeric root extract 500 mg tablet Take 1 tablet by mouth once daily.       No current facility-administered medications for this visit.

## 2025-07-31 LAB
ERYTHROCYTE [DISTWIDTH] IN BLOOD BY AUTOMATED COUNT: 13.2 % (ref 11–15)
HCT VFR BLD AUTO: 48.7 % (ref 38.5–50)
HGB BLD-MCNC: 16.3 G/DL (ref 13.2–17.1)
MCH RBC QN AUTO: 30.1 PG (ref 27–33)
MCHC RBC AUTO-ENTMCNC: 33.5 G/DL (ref 32–36)
MCV RBC AUTO: 90 FL (ref 80–100)
PLATELET # BLD AUTO: 201 THOUSAND/UL (ref 140–400)
PMV BLD REES-ECKER: 10.7 FL (ref 7.5–12.5)
PSA SERPL-MCNC: 0.32 NG/ML
RBC # BLD AUTO: 5.41 MILLION/UL (ref 4.2–5.8)
TESTOST SERPL-MCNC: 422 NG/DL (ref 250–827)
WBC # BLD AUTO: 5 THOUSAND/UL (ref 3.8–10.8)

## 2026-07-29 ENCOUNTER — APPOINTMENT (OUTPATIENT)
Dept: ENDOCRINOLOGY | Facility: CLINIC | Age: 61
End: 2026-07-29
Payer: COMMERCIAL